# Patient Record
Sex: MALE | Race: WHITE | NOT HISPANIC OR LATINO | Employment: OTHER | URBAN - METROPOLITAN AREA
[De-identification: names, ages, dates, MRNs, and addresses within clinical notes are randomized per-mention and may not be internally consistent; named-entity substitution may affect disease eponyms.]

---

## 2014-05-01 LAB
CRC RECOMMENDATION EXT: NORMAL
CRC RECOMMENDATION EXT: NORMAL

## 2019-06-03 ENCOUNTER — HISTORICAL (OUTPATIENT)
Dept: LAB | Facility: HOSPITAL | Age: 69
End: 2019-06-03

## 2019-06-03 LAB
ALBUMIN SERPL-MCNC: 3.5 GM/DL (ref 3.4–5)
ALP SERPL-CCNC: 116 UNIT/L (ref 46–116)
ALT SERPL-CCNC: 23 UNIT/L (ref 12–78)
AST SERPL-CCNC: 14 UNIT/L (ref 15–37)
BILIRUB SERPL-MCNC: 0.8 MG/DL (ref 0.2–1)
BILIRUBIN DIRECT+TOT PNL SERPL-MCNC: 0.24 MG/DL (ref 0–0.2)
BILIRUBIN DIRECT+TOT PNL SERPL-MCNC: 0.56 MG/DL (ref 0–0.8)
CHOLEST SERPL-MCNC: 150 MG/DL (ref 0–200)
CHOLEST/HDLC SERPL: 3.5 {RATIO} (ref 0–5)
HDLC SERPL-MCNC: 43 MG/DL (ref 40–60)
LDLC SERPL CALC-MCNC: 71 MG/DL (ref 0–129)
PROT SERPL-MCNC: 7.5 GM/DL (ref 6.4–8.2)
TRIGL SERPL-MCNC: 182 MG/DL
VLDLC SERPL CALC-MCNC: 36 MG/DL

## 2020-05-05 ENCOUNTER — HISTORICAL (OUTPATIENT)
Dept: LAB | Facility: HOSPITAL | Age: 70
End: 2020-05-05

## 2020-05-05 LAB
ALBUMIN SERPL-MCNC: 3.7 GM/DL (ref 3.4–5)
ALP SERPL-CCNC: 88 UNIT/L (ref 46–116)
ALT SERPL-CCNC: 31 UNIT/L (ref 12–78)
AST SERPL-CCNC: 32 UNIT/L (ref 15–37)
BILIRUB SERPL-MCNC: 0.8 MG/DL (ref 0.2–1)
BILIRUBIN DIRECT+TOT PNL SERPL-MCNC: 0.28 MG/DL (ref 0–0.2)
BILIRUBIN DIRECT+TOT PNL SERPL-MCNC: 0.52 MG/DL (ref 0–0.8)
CHOLEST SERPL-MCNC: 133 MG/DL (ref 0–200)
CHOLEST/HDLC SERPL: 3.3 {RATIO} (ref 0–5)
HDLC SERPL-MCNC: 40 MG/DL (ref 40–60)
LDLC SERPL CALC-MCNC: 66 MG/DL (ref 0–129)
PROT SERPL-MCNC: 7.6 GM/DL (ref 6.4–8.2)
TRIGL SERPL-MCNC: 133 MG/DL
VLDLC SERPL CALC-MCNC: 27 MG/DL

## 2021-05-17 LAB
LEFT EYE DM RETINOPATHY: POSITIVE
RIGHT EYE DM RETINOPATHY: POSITIVE

## 2022-04-11 ENCOUNTER — HISTORICAL (OUTPATIENT)
Dept: ADMINISTRATIVE | Facility: HOSPITAL | Age: 72
End: 2022-04-11

## 2022-04-25 VITALS
DIASTOLIC BLOOD PRESSURE: 62 MMHG | OXYGEN SATURATION: 97 % | SYSTOLIC BLOOD PRESSURE: 124 MMHG | HEIGHT: 70 IN | BODY MASS INDEX: 38.7 KG/M2 | WEIGHT: 270.31 LBS

## 2022-05-25 DIAGNOSIS — E78.5 HYPERLIPIDEMIA, UNSPECIFIED HYPERLIPIDEMIA TYPE: ICD-10-CM

## 2022-05-25 DIAGNOSIS — I25.10 CORONARY ARTERY DISEASE, UNSPECIFIED VESSEL OR LESION TYPE, UNSPECIFIED WHETHER ANGINA PRESENT, UNSPECIFIED WHETHER NATIVE OR TRANSPLANTED HEART: Primary | ICD-10-CM

## 2022-05-25 DIAGNOSIS — Z79.4 TYPE 2 DIABETES MELLITUS WITH DIABETIC POLYNEUROPATHY, WITH LONG-TERM CURRENT USE OF INSULIN: ICD-10-CM

## 2022-05-25 DIAGNOSIS — G47.33 OSA (OBSTRUCTIVE SLEEP APNEA): ICD-10-CM

## 2022-05-25 DIAGNOSIS — E66.01 MORBID OBESITY: ICD-10-CM

## 2022-05-25 DIAGNOSIS — N17.9 ACUTE KIDNEY INJURY: ICD-10-CM

## 2022-05-25 DIAGNOSIS — E11.42 TYPE 2 DIABETES MELLITUS WITH DIABETIC POLYNEUROPATHY, WITH LONG-TERM CURRENT USE OF INSULIN: ICD-10-CM

## 2022-05-25 DIAGNOSIS — I10 HYPERTENSION, UNSPECIFIED TYPE: ICD-10-CM

## 2022-05-26 ENCOUNTER — TELEPHONE (OUTPATIENT)
Dept: FAMILY MEDICINE | Facility: CLINIC | Age: 72
End: 2022-05-26
Payer: MEDICARE

## 2022-05-26 NOTE — TELEPHONE ENCOUNTER
1. Are there any outstanding tasks in patient's chart?    n  2. Do we have outstanding/pending referrals?    n  3. Has the patient been seen in an ER, Urgent Care, or admitted since last visit?  n    4. Has patient seen any other health care providers since last visit?  n    5.  Has patient had any blood work or x-rays done since last visit?  Will complete [facility] at next [facility] appointment

## 2022-05-31 LAB
% NEUTROPHILS (OHS): 50.9 % (ref 44–80)
25(OH)D3 SERPL-MCNC: 38.66 NG/ML
ALBUMIN SERPL BCP-MCNC: 4.2 G/DL (ref 3.2–4.7)
ALP SERPL-CCNC: 72 U/L (ref 38–126)
ALT SERPL W P-5'-P-CCNC: 37 U/L (ref 17–63)
AST SERPL-CCNC: 29 U/L (ref 8–40)
BASOPHILS NFR BLD: 0.1 K/UL (ref 0–0.2)
BASOPHILS NFR BLD: 0.8 % (ref 0–2)
BUN BLD-MCNC: 20 MG/DL (ref 7–20)
CALCIUM SERPL-MCNC: 9.8 MG/DL (ref 8.1–10.3)
CHLORIDE: 100 MMOL/L (ref 98–107)
CHOLEST SERPL-MSCNC: 190 MG/DL (ref 0–200)
CO2 SERPL-SCNC: 30 MMOL/L (ref 23–32)
CREAT SERPL-MCNC: 1.4 MG/DL (ref 0.7–1.2)
EOSINOPHIL NFR BLD: 0.2 K/UL (ref 0–0.5)
EOSINOPHIL NFR BLD: 2.9 % (ref 0–5)
GLUCOSE: 101 MG/DL (ref 70–110)
HBA1C MFR BLD: 11.4 % (ref 4.2–5.8)
HCT VFR BLD AUTO: 44.2 % (ref 42–52)
HDLC SERPL-MCNC: 44 MG/DL (ref 29–71)
HGB BLD-MCNC: 15.5 G/DL (ref 14–18)
LDLC SERPL CALC-MCNC: 125 MG/DL (ref 0–100)
LYMPH #: 2.8 K/UL (ref 0.8–3.1)
LYMPH%: 34.7 % (ref 13–45)
MCH RBC QN AUTO: 33.5 UUG (ref 26–34)
MCHC RBC AUTO-ENTMCNC: 35 GM/DL (ref 31–37)
MCV RBC AUTO: 95.6 FL (ref 81–99)
MONO #: 0.9 K/UL (ref 0.12–0.8)
MONO%: 10.7 % (ref 2–11)
NEUTROPHILS - ABS (DIFF): 4 /ΜL (ref 2–8)
PLATELET # BLD AUTO: 251 K/CMM (ref 150–450)
POTASSIUM: 4.5 MMOL/L (ref 3.4–5)
PSA SERPL-MCNC: 0.71 NG/ML (ref 0–4)
RBC # BLD AUTO: 4.63 M/CMM (ref 4.7–6.1)
RDW-CV: 13.6 % (ref 11.5–14.5)
SODIUM: 138 MMOL/L (ref 135–145)
TOTAL PROTEIN: 8 G/DL (ref 6.4–8.3)
TRIGL SERPL-MCNC: 147 MG/DL (ref 0–200)
WBC: 7.9 K/CMM (ref 4.5–10.8)

## 2022-09-08 LAB
ALBUMIN SERPL BCP-MCNC: 4.1 G/DL (ref 3.2–4.7)
ALP SERPL-CCNC: 67 U/L (ref 38–126)
ALT SERPL W P-5'-P-CCNC: 28 U/L (ref 17–63)
AST SERPL-CCNC: 27 U/L (ref 8–40)
BILIRUB SERPL-MCNC: 0.8 MG/DL (ref 0–1)
BUN BLD-MCNC: 20 MG/DL (ref 7–20)
CALCIUM SERPL-MCNC: 9.6 MG/DL (ref 8.1–10.3)
CHLORIDE: 102 MMOL/L (ref 98–107)
CREAT SERPL-MCNC: 1.5 MG/DL (ref 0.7–1.2)
CREAT UR-MCNC: 145.9 MG/DL
GLUCOSE: 130 MG/DL (ref 70–110)
HBA1C MFR BLD: 9.3 % (ref 4.2–5.8)
Lab: 34.6 (ref 0–16)
MICROALBUMIN UR-MCNC: 54.8 MG/L (ref ?–19)
POTASSIUM: 4.7 MMOL/L (ref 3.4–5)
SODIUM: 139 MMOL/L (ref 135–145)
TOTAL PROTEIN: 7.5 G/DL (ref 6.4–8.3)

## 2022-11-21 ENCOUNTER — TELEPHONE (OUTPATIENT)
Dept: FAMILY MEDICINE | Facility: CLINIC | Age: 72
End: 2022-11-21
Payer: MEDICARE

## 2022-11-21 NOTE — TELEPHONE ENCOUNTER
No answer / no voicemail when attempted to contact patient for previsit call.  [facility] completes labs at [facility] visits. No labs needed for this visit

## 2022-12-01 ENCOUNTER — OFFICE VISIT (OUTPATIENT)
Dept: FAMILY MEDICINE | Facility: CLINIC | Age: 72
End: 2022-12-01
Payer: MEDICARE

## 2022-12-01 VITALS
HEART RATE: 82 BPM | SYSTOLIC BLOOD PRESSURE: 120 MMHG | BODY MASS INDEX: 39.37 KG/M2 | RESPIRATION RATE: 16 BRPM | DIASTOLIC BLOOD PRESSURE: 80 MMHG | HEIGHT: 70 IN | WEIGHT: 275 LBS | OXYGEN SATURATION: 98 % | TEMPERATURE: 98 F

## 2022-12-01 DIAGNOSIS — Z71.89 ADVANCED CARE PLANNING/COUNSELING DISCUSSION: ICD-10-CM

## 2022-12-01 DIAGNOSIS — Z12.5 PROSTATE CANCER SCREENING: ICD-10-CM

## 2022-12-01 DIAGNOSIS — Z23 NEED FOR PNEUMOCOCCAL VACCINATION: ICD-10-CM

## 2022-12-01 DIAGNOSIS — Z79.4 TYPE 2 DIABETES MELLITUS WITH DIABETIC POLYNEUROPATHY, WITH LONG-TERM CURRENT USE OF INSULIN: ICD-10-CM

## 2022-12-01 DIAGNOSIS — Z00.00 MEDICARE ANNUAL WELLNESS VISIT, SUBSEQUENT: Primary | ICD-10-CM

## 2022-12-01 DIAGNOSIS — I10 HYPERTENSION, UNSPECIFIED TYPE: ICD-10-CM

## 2022-12-01 DIAGNOSIS — E11.42 TYPE 2 DIABETES MELLITUS WITH DIABETIC POLYNEUROPATHY, WITH LONG-TERM CURRENT USE OF INSULIN: ICD-10-CM

## 2022-12-01 DIAGNOSIS — E78.5 HYPERLIPIDEMIA, UNSPECIFIED HYPERLIPIDEMIA TYPE: ICD-10-CM

## 2022-12-01 DIAGNOSIS — I25.10 CORONARY ARTERY DISEASE, UNSPECIFIED VESSEL OR LESION TYPE, UNSPECIFIED WHETHER ANGINA PRESENT, UNSPECIFIED WHETHER NATIVE OR TRANSPLANTED HEART: ICD-10-CM

## 2022-12-01 PROCEDURE — 90677 PCV20 VACCINE IM: CPT | Mod: ,,, | Performed by: NURSE PRACTITIONER

## 2022-12-01 PROCEDURE — G0439 PPPS, SUBSEQ VISIT: HCPCS | Mod: ,,, | Performed by: NURSE PRACTITIONER

## 2022-12-01 PROCEDURE — G0009 PNEUMOCOCCAL CONJUGATE VACCINE 20-VALENT: ICD-10-PCS | Mod: ,,, | Performed by: NURSE PRACTITIONER

## 2022-12-01 PROCEDURE — G0009 ADMIN PNEUMOCOCCAL VACCINE: HCPCS | Mod: ,,, | Performed by: NURSE PRACTITIONER

## 2022-12-01 PROCEDURE — 90677 PNEUMOCOCCAL CONJUGATE VACCINE 20-VALENT: ICD-10-PCS | Mod: ,,, | Performed by: NURSE PRACTITIONER

## 2022-12-01 PROCEDURE — G0439 PR MEDICARE ANNUAL WELLNESS SUBSEQUENT VISIT: ICD-10-PCS | Mod: ,,, | Performed by: NURSE PRACTITIONER

## 2022-12-01 RX ORDER — METFORMIN HYDROCHLORIDE 1000 MG/1
1000 TABLET ORAL 2 TIMES DAILY WITH MEALS
COMMUNITY
End: 2023-11-30

## 2022-12-01 RX ORDER — LISINOPRIL 10 MG/1
10 TABLET ORAL
COMMUNITY
Start: 2022-07-12 | End: 2023-03-29

## 2022-12-01 RX ORDER — ATORVASTATIN CALCIUM 80 MG/1
80 TABLET, FILM COATED ORAL
COMMUNITY

## 2022-12-01 RX ORDER — ASPIRIN 81 MG/1
81 TABLET ORAL DAILY
COMMUNITY
End: 2023-09-21 | Stop reason: ALTCHOICE

## 2022-12-01 RX ORDER — ALOGLIPTIN 25 MG/1
25 TABLET, FILM COATED ORAL DAILY
COMMUNITY

## 2022-12-01 RX ORDER — CARVEDILOL 25 MG/1
12.5 TABLET ORAL 2 TIMES DAILY
COMMUNITY
End: 2023-09-21 | Stop reason: DRUGHIGH

## 2022-12-02 ENCOUNTER — DOCUMENTATION ONLY (OUTPATIENT)
Dept: INTERNAL MEDICINE | Facility: CLINIC | Age: 72
End: 2022-12-02
Payer: MEDICARE

## 2022-12-02 ENCOUNTER — DOCUMENTATION ONLY (OUTPATIENT)
Dept: FAMILY MEDICINE | Facility: CLINIC | Age: 72
End: 2022-12-02
Payer: MEDICARE

## 2022-12-02 NOTE — ASSESSMENT & PLAN NOTE
Healthcare power of  and living will completed at visit and scanned into epic.  Originals given to patient.

## 2022-12-02 NOTE — ASSESSMENT & PLAN NOTE
Has not seen Endocrinology now for the last 6-8 months.  Encouraged patient follow-up with Endocrinology.

## 2022-12-02 NOTE — ASSESSMENT & PLAN NOTE
Stable on current meds, [facility] Prince, follow-up here in 1 year.

## 2023-03-06 PROBLEM — Z00.00 MEDICARE ANNUAL WELLNESS VISIT, SUBSEQUENT: Status: RESOLVED | Noted: 2022-12-01 | Resolved: 2023-03-06

## 2023-03-22 ENCOUNTER — TELEPHONE (OUTPATIENT)
Dept: FAMILY MEDICINE | Facility: CLINIC | Age: 73
End: 2023-03-22
Payer: MEDICARE

## 2023-03-22 NOTE — TELEPHONE ENCOUNTER
Call from [facility] with information about patients [employer].  He does not have a modem on the machine so the only way the information will be able to be sent over is if the patient brings the SD card to [facility] and they upload the information.  Patient will bring in his SD card the next time he goes to [facility].

## 2023-03-22 NOTE — LETTER
AUTHORIZATION FOR RELEASE OF   CONFIDENTIAL INFORMATION    Dear Endy,    We are seeing Modesto Franco, date of birth 1950, in the clinic at No Department Specified. [facility]. Modesto Franco has an outstanding lab/procedure at the time we reviewed his chart. In order to help keep his health information updated, he has authorized us to request the following medical record(s):        (  )  MAMMOGRAM                                      (  )  COLONOSCOPY      (  )  PAP SMEAR                                          (  )  OUTSIDE LAB RESULTS     (  )  DEXA SCAN                                          (  )  EYE EXAM            (  )  FOOT EXAM                                          (  )  ENTIRE RECORD     (  )  OUTSIDE IMMUNIZATIONS                 ( x ) SLEEP STUDY REPORT       Please fax records to [facility], [phone]      If you have any questions, please contact [phone]          Patient Name: Modesto Franco  : 1950  Patient Phone #: [phone]

## 2023-05-18 LAB
LEFT EYE DM RETINOPATHY: POSITIVE
RIGHT EYE DM RETINOPATHY: POSITIVE

## 2023-07-28 ENCOUNTER — LAB VISIT (OUTPATIENT)
Dept: LAB | Facility: HOSPITAL | Age: 73
End: 2023-07-28
Attending: CLINICAL NURSE SPECIALIST
Payer: MEDICARE

## 2023-07-28 DIAGNOSIS — E78.5 HYPERLIPIDEMIA, UNSPECIFIED HYPERLIPIDEMIA TYPE: Primary | ICD-10-CM

## 2023-07-28 DIAGNOSIS — I10 ESSENTIAL HYPERTENSION, MALIGNANT: ICD-10-CM

## 2023-07-28 LAB
ALBUMIN SERPL-MCNC: 3.7 G/DL (ref 3.4–4.8)
ALP SERPL-CCNC: 77 UNIT/L (ref 40–150)
ALT SERPL-CCNC: 21 UNIT/L (ref 0–55)
AST SERPL-CCNC: 24 UNIT/L (ref 5–34)
BILIRUBIN DIRECT+TOT PNL SERPL-MCNC: 0.4 MG/DL (ref 0–?)
BILIRUBIN DIRECT+TOT PNL SERPL-MCNC: 0.9 MG/DL (ref 0–0.8)
BILIRUBIN DIRECT+TOT PNL SERPL-MCNC: 1.3 MG/DL
CHOLEST SERPL-MCNC: 160 MG/DL
CHOLEST/HDLC SERPL: 5 {RATIO} (ref 0–5)
HDLC SERPL-MCNC: 31 MG/DL (ref 35–60)
LDLC SERPL CALC-MCNC: 105 MG/DL (ref 50–140)
PROT SERPL-MCNC: 7 GM/DL (ref 5.8–7.6)
TRIGL SERPL-MCNC: 122 MG/DL (ref 34–140)
VLDLC SERPL CALC-MCNC: 24 MG/DL

## 2023-07-28 PROCEDURE — 80076 HEPATIC FUNCTION PANEL: CPT

## 2023-07-28 PROCEDURE — 36415 COLL VENOUS BLD VENIPUNCTURE: CPT

## 2023-07-28 PROCEDURE — 80061 LIPID PANEL: CPT

## 2023-09-05 LAB
A1C: 10.3 (ref 4.2–5.8)
ALBUMIN: 4 (ref 3.2–4.7)
ALKALINE PHOS(POC): 66 (ref 38–126)
ALT: 21 (ref 17–63)
AST: 32 (ref 8–40)
BASO, FLUID: 1 % (ref 0–2)
BASOPHILS NFR BLD: 0.1 K/UL (ref 0–0.2)
CALCIUM SERPL-MCNC: 9.1 MG/DL (ref 8.1–10.3)
CHLORIDE: 99 MMOL/L (ref 98–107)
CHOLEST SERPL-MSCNC: 185 MG/DL (ref 0–200)
CO2 SERPL-SCNC: 35 MMOL/L (ref 23–32)
CREAT SERPL-MCNC: 1.6 MG/DL (ref 0.7–1.2)
EOS, FLUID: 1.2 % (ref 0–5)
EOSINOPHIL NFR BLD: 0.1 K/UL (ref 0–0.5)
ERYTHROCYTE [DISTWIDTH] IN BLOOD BY AUTOMATED COUNT: 13.6 % (ref 11.5–14.5)
GLUCOSE: 78 (ref 70–110)
HCT: 45.9 (ref 42–52)
HDLC SERPL-MCNC: 38 MG/DL (ref 29–71)
HGB: 15.5 (ref 14–18)
LDLC SERPL CALC-MCNC: 119 MG/DL (ref 0–100)
LYMPH #: 2.9 K/UL (ref 0.8–3.1)
LYMPH%: 30.8 % (ref 13–45)
MCH RBC QN AUTO: 32.1 PG (ref 26–34)
MCHC RBC AUTO-ENTMCNC: 33.8 G/DL (ref 31–37)
MCV RBC AUTO: 95.1 FL (ref 81–99)
MONO #: 0.9 K/UL (ref 0.12–0.8)
MONO%: 10.1 % (ref 2–11)
NEUTROPHILS NFR BLD: 5.3 % (ref 2–7.5)
PLATELET COUNT: 286 (ref 150–450)
POTASSIUM: 4.1 MMOL/L (ref 3.4–5)
RBC # BLD AUTO: 4.82 10*6/UL (ref 4.7–6.1)
SODIUM BLD-SCNC: 135 MMOL/L (ref 135–145)
TOTAL PROTEIN: 8 G/DL (ref 6.4–8.3)
TRIGL SERPL-MCNC: 117 MG/DL (ref 40–160)
TRIMETHOPRIM ISLT KB: 56.9 % (ref 44–80)
UREA NITROGEN (BUN): 22 MG/DL (ref 7–20)
WBC: 9.3 (ref 4.5–10.8)

## 2023-09-08 LAB
CALCIUM SERPL-MCNC: 9 MG/DL (ref 8.6–10.5)
CHLORIDE: 98 MMOL/L (ref 98–109)
CO2 SERPL-SCNC: 27 MMOL/L (ref 22–34)
CREAT SERPL-MCNC: 1.8 MG/DL (ref 0.6–1.3)
EGFR: 37.2
ERYTHROCYTE [DISTWIDTH] IN BLOOD BY AUTOMATED COUNT: 13.6 % (ref 12.1–16.2)
GLUCOSE: 245 (ref 70–106)
HCT: 46.7 (ref 36.7–47.1)
HGB: 15.2 (ref 12.5–16.3)
MCH RBC QN AUTO: 31.9 PG (ref 23.8–33.4)
MCHC RBC AUTO-ENTMCNC: 32.5 G/DL (ref 32.5–36.3)
MCV RBC AUTO: 98.1 FL (ref 73–96.2)
MPC BLD CALC-MCNC: 8.3 FL (ref 7.4–11.4)
PLATELET COUNT: 269 (ref 152–348)
POTASSIUM: 5.2 MMOL/L (ref 3.4–5)
RBC # BLD AUTO: 4.76 10*6/UL (ref 4.06–5.63)
SODIUM BLD-SCNC: 136 MMOL/L (ref 135–145)
UREA NITROGEN (BUN): 24 MG/DL (ref 7–25)
WBC: 7.2 (ref 3.6–10.2)

## 2023-09-11 ENCOUNTER — LAB VISIT (OUTPATIENT)
Dept: LAB | Facility: HOSPITAL | Age: 73
End: 2023-09-11
Attending: NURSE PRACTITIONER
Payer: MEDICARE

## 2023-09-11 ENCOUNTER — TELEPHONE (OUTPATIENT)
Dept: FAMILY MEDICINE | Facility: CLINIC | Age: 73
End: 2023-09-11
Payer: MEDICARE

## 2023-09-11 DIAGNOSIS — N18.9 CHRONIC KIDNEY DISEASE, UNSPECIFIED: ICD-10-CM

## 2023-09-11 DIAGNOSIS — I25.10 CORONARY ATHEROSCLEROSIS OF NATIVE CORONARY ARTERY: ICD-10-CM

## 2023-09-11 DIAGNOSIS — R94.39 ABNORMAL BALLISTOCARDIOGRAM: Primary | ICD-10-CM

## 2023-09-11 LAB
ANION GAP SERPL CALC-SCNC: 13 MEQ/L
BUN SERPL-MCNC: 20.3 MG/DL (ref 8.4–25.7)
CALCIUM SERPL-MCNC: 9.3 MG/DL (ref 8.8–10)
CHLORIDE SERPL-SCNC: 95 MMOL/L (ref 98–107)
CO2 SERPL-SCNC: 23 MMOL/L (ref 23–31)
CREAT SERPL-MCNC: 1.99 MG/DL (ref 0.73–1.18)
CREAT/UREA NIT SERPL: 10
GFR SERPLBLD CREATININE-BSD FMLA CKD-EPI: 35 MLS/MIN/1.73/M2
GLUCOSE SERPL-MCNC: 408 MG/DL (ref 82–115)
POTASSIUM SERPL-SCNC: 4.6 MMOL/L (ref 3.5–5.1)
SODIUM SERPL-SCNC: 131 MMOL/L (ref 136–145)

## 2023-09-11 PROCEDURE — 36415 COLL VENOUS BLD VENIPUNCTURE: CPT

## 2023-09-11 PROCEDURE — 80048 BASIC METABOLIC PNL TOTAL CA: CPT

## 2023-09-11 NOTE — TELEPHONE ENCOUNTER
Left a message for Hailey at [facility] to forward chart notes and lab results to Dr. Kennedy to address patients sugar levels.

## 2023-09-11 NOTE — TELEPHONE ENCOUNTER
Patient is supposed to be seeing endocrinology, Dr. Kennedy, for his diabetes management.  He only comes here once yearly because [facility].  He needs to follow-up with his endocrinologist.  I do not prescribe his diabetes medications.

## 2023-09-11 NOTE — TELEPHONE ENCOUNTER
Call from Josselyn at [facility] stating that they ordered labs at [facility] for the patient to complete for his Angiogram.  They wanted to inform you that his sugar level was 408 so that you can address.  He had a few other test that were elevated so they had him return for additional testing.  Please review lab results in patient chart.

## 2023-09-12 ENCOUNTER — HOSPITAL ENCOUNTER (OUTPATIENT)
Facility: HOSPITAL | Age: 73
Discharge: HOME | End: 2023-09-12
Attending: INTERNAL MEDICINE | Admitting: INTERNAL MEDICINE
Payer: MEDICARE

## 2023-09-12 DIAGNOSIS — I25.10 CAD (CORONARY ARTERY DISEASE): ICD-10-CM

## 2023-09-12 DIAGNOSIS — Z01.810 PREOP CARDIOVASCULAR EXAM: ICD-10-CM

## 2023-09-12 DIAGNOSIS — I65.23 BILATERAL CAROTID ARTERY STENOSIS: ICD-10-CM

## 2023-09-12 DIAGNOSIS — R94.39 ABNORMAL STRESS TEST: ICD-10-CM

## 2023-09-12 PROCEDURE — 99900031 HC PATIENT EDUCATION (STAT)

## 2023-09-12 PROCEDURE — 93005 ELECTROCARDIOGRAM TRACING: CPT

## 2023-09-12 PROCEDURE — C1769 GUIDE WIRE: HCPCS | Performed by: INTERNAL MEDICINE

## 2023-09-12 PROCEDURE — C1894 INTRO/SHEATH, NON-LASER: HCPCS | Performed by: INTERNAL MEDICINE

## 2023-09-12 PROCEDURE — C1725 CATH, TRANSLUMIN NON-LASER: HCPCS | Performed by: INTERNAL MEDICINE

## 2023-09-12 PROCEDURE — 99152 MOD SED SAME PHYS/QHP 5/>YRS: CPT | Performed by: INTERNAL MEDICINE

## 2023-09-12 PROCEDURE — 25500020 PHARM REV CODE 255: Performed by: INTERNAL MEDICINE

## 2023-09-12 PROCEDURE — C1874 STENT, COATED/COV W/DEL SYS: HCPCS | Performed by: INTERNAL MEDICINE

## 2023-09-12 PROCEDURE — 0715T *HC CORONARY LITHOTRIPSY: CPT | Performed by: INTERNAL MEDICINE

## 2023-09-12 PROCEDURE — 93459 L HRT ART/GRFT ANGIO: CPT | Mod: 59 | Performed by: INTERNAL MEDICINE

## 2023-09-12 PROCEDURE — 27201423 OPTIME MED/SURG SUP & DEVICES STERILE SUPPLY: Performed by: INTERNAL MEDICINE

## 2023-09-12 PROCEDURE — 93010 EKG 12-LEAD: ICD-10-PCS | Mod: ,,, | Performed by: INTERNAL MEDICINE

## 2023-09-12 PROCEDURE — 63600175 PHARM REV CODE 636 W HCPCS: Performed by: INTERNAL MEDICINE

## 2023-09-12 PROCEDURE — 25000003 PHARM REV CODE 250: Performed by: INTERNAL MEDICINE

## 2023-09-12 PROCEDURE — 93010 ELECTROCARDIOGRAM REPORT: CPT | Mod: ,,, | Performed by: INTERNAL MEDICINE

## 2023-09-12 PROCEDURE — C9600 PERC DRUG-EL COR STENT SING: HCPCS | Mod: LC | Performed by: INTERNAL MEDICINE

## 2023-09-12 PROCEDURE — C1887 CATHETER, GUIDING: HCPCS | Performed by: INTERNAL MEDICINE

## 2023-09-12 PROCEDURE — C1761 OPTIME CATH, TRANSLUMINAL INTRAVASC LITHO, CORONARY: HCPCS | Performed by: INTERNAL MEDICINE

## 2023-09-12 PROCEDURE — 99153 MOD SED SAME PHYS/QHP EA: CPT | Performed by: INTERNAL MEDICINE

## 2023-09-12 DEVICE — EVEROLIMUS-ELUTING PLATINUM CHROMIUM CORONARY STENT SYSTEM
Type: IMPLANTABLE DEVICE | Site: HEART | Status: FUNCTIONAL
Brand: SYNERGY™ XD

## 2023-09-12 RX ORDER — SODIUM CHLORIDE 0.9 % (FLUSH) 0.9 %
10 SYRINGE (ML) INJECTION
Status: DISCONTINUED | OUTPATIENT
Start: 2023-09-12 | End: 2023-09-21

## 2023-09-12 RX ORDER — ACETAMINOPHEN 325 MG/1
650 TABLET ORAL EVERY 4 HOURS PRN
Status: DISCONTINUED | OUTPATIENT
Start: 2023-09-12 | End: 2023-09-12 | Stop reason: HOSPADM

## 2023-09-12 RX ORDER — HYDROCODONE BITARTRATE AND ACETAMINOPHEN 5; 325 MG/1; MG/1
1 TABLET ORAL EVERY 4 HOURS PRN
Status: DISCONTINUED | OUTPATIENT
Start: 2023-09-12 | End: 2023-09-12 | Stop reason: HOSPADM

## 2023-09-12 RX ORDER — IBUPROFEN 800 MG/1
800 TABLET ORAL EVERY 6 HOURS PRN
COMMUNITY
End: 2023-09-21

## 2023-09-12 RX ORDER — SODIUM CHLORIDE 9 MG/ML
INJECTION, SOLUTION INTRAVENOUS ONCE
Status: DISCONTINUED | OUTPATIENT
Start: 2023-09-12 | End: 2023-09-12

## 2023-09-12 RX ORDER — ROSUVASTATIN CALCIUM 40 MG/1
40 TABLET, COATED ORAL NIGHTLY
Status: ON HOLD | COMMUNITY
End: 2023-09-12 | Stop reason: CLARIF

## 2023-09-12 RX ORDER — MORPHINE SULFATE 4 MG/ML
2 INJECTION, SOLUTION INTRAMUSCULAR; INTRAVENOUS EVERY 4 HOURS PRN
Status: DISCONTINUED | OUTPATIENT
Start: 2023-09-12 | End: 2023-09-12 | Stop reason: HOSPADM

## 2023-09-12 RX ORDER — DIPHENHYDRAMINE HCL 50 MG
50 CAPSULE ORAL
Status: DISCONTINUED | OUTPATIENT
Start: 2023-09-12 | End: 2023-09-21

## 2023-09-12 RX ORDER — ONDANSETRON 4 MG/1
8 TABLET, ORALLY DISINTEGRATING ORAL EVERY 8 HOURS PRN
Status: DISCONTINUED | OUTPATIENT
Start: 2023-09-12 | End: 2023-09-12 | Stop reason: HOSPADM

## 2023-09-12 RX ORDER — SODIUM CHLORIDE 9 MG/ML
INJECTION, SOLUTION INTRAVENOUS CONTINUOUS
Status: ACTIVE | OUTPATIENT
Start: 2023-09-12 | End: 2023-09-12

## 2023-09-12 RX ORDER — HEPARIN SODIUM 1000 [USP'U]/ML
INJECTION, SOLUTION INTRAVENOUS; SUBCUTANEOUS
Status: DISCONTINUED | OUTPATIENT
Start: 2023-09-12 | End: 2023-09-12 | Stop reason: HOSPADM

## 2023-09-12 RX ORDER — DIAZEPAM 5 MG/1
10 TABLET ORAL
Status: DISCONTINUED | OUTPATIENT
Start: 2023-09-12 | End: 2023-09-21

## 2023-09-12 RX ORDER — MIDAZOLAM HYDROCHLORIDE 1 MG/ML
INJECTION, SOLUTION INTRAMUSCULAR; INTRAVENOUS
Status: DISCONTINUED | OUTPATIENT
Start: 2023-09-12 | End: 2023-09-12 | Stop reason: HOSPADM

## 2023-09-12 RX ORDER — LIDOCAINE HYDROCHLORIDE 10 MG/ML
INJECTION INFILTRATION; PERINEURAL
Status: DISCONTINUED | OUTPATIENT
Start: 2023-09-12 | End: 2023-09-12 | Stop reason: HOSPADM

## 2023-09-12 RX ORDER — SODIUM CHLORIDE 9 MG/ML
0-999 INJECTION, SOLUTION INTRAVENOUS CONTINUOUS
Status: DISPENSED | OUTPATIENT
Start: 2023-09-12 | End: 2023-09-12

## 2023-09-12 RX ORDER — FENTANYL CITRATE 50 UG/ML
INJECTION, SOLUTION INTRAMUSCULAR; INTRAVENOUS
Status: DISCONTINUED | OUTPATIENT
Start: 2023-09-12 | End: 2023-09-12 | Stop reason: HOSPADM

## 2023-09-12 RX ORDER — INSULIN DETEMIR 100 [IU]/ML
75 INJECTION, SOLUTION SUBCUTANEOUS 2 TIMES DAILY
COMMUNITY

## 2023-09-12 RX ORDER — MAG HYDROX/ALUMINUM HYD/SIMETH 200-200-20
SUSPENSION, ORAL (FINAL DOSE FORM) ORAL
Status: DISCONTINUED | OUTPATIENT
Start: 2023-09-12 | End: 2023-09-12 | Stop reason: HOSPADM

## 2023-09-12 RX ADMIN — DIPHENHYDRAMINE HYDROCHLORIDE 50 MG: 50 CAPSULE ORAL at 07:09

## 2023-09-12 RX ADMIN — SODIUM CHLORIDE: 9 INJECTION, SOLUTION INTRAVENOUS at 09:09

## 2023-09-12 RX ADMIN — SODIUM CHLORIDE 125 ML/HR: 9 INJECTION, SOLUTION INTRAVENOUS at 07:09

## 2023-09-12 RX ADMIN — DIAZEPAM 10 MG: 5 TABLET ORAL at 07:09

## 2023-09-12 NOTE — Clinical Note
The catheter was inserted into the, insertion attempt was made into the and was inserted over the wire into the left main  circumflex. CATH REMOVED

## 2023-09-12 NOTE — Clinical Note
The catheter was repositioned into the and was inserted over the wire into the ostium   right coronary artery. An angiography was performed of the right coronary arteries. Multiple views were taken. The angiography was performed via power injection.  JR Cardoza

## 2023-09-12 NOTE — Clinical Note
A dressing was applied to the right femoral artery puncture site. Sheath left in place, manual pressure will be held in outpatient

## 2023-09-12 NOTE — Clinical Note
The catheter was inserted into the and was inserted over the wire into the ostium   left main. Hemodynamics were performed.  An angiography was performed of the left coronary arteries. Multiple views were taken. The angiography was performed via power injection.  JL 4

## 2023-09-12 NOTE — Clinical Note
The catheter insertion attempt was made into the and was inserted over the wire into the. An angiography was performed of the ATTEMPTED TO INSERT IN GRAFT. Multiple views were taken. The angiography was performed via power injection.  CATH REMOVED

## 2023-09-12 NOTE — Clinical Note
The catheter was inserted into the and was inserted over the wire into the ostial LIMA graft. Hemodynamics were performed.  An angiography was performed of the graft. Multiple views were taken. The angiography was performed via power injection.  MALONE TO LAD  CATH. REMOVED

## 2023-09-12 NOTE — Clinical Note
The catheter was repositioned into the and was inserted over the wire into the ostial SVG graft. An angiography was performed of the graft. Multiple views were taken. The angiography was performed via power injection.  JR 4  SVG TO DIAG  SVG TO LCX    CATH. REMOVED

## 2023-09-12 NOTE — DISCHARGE INSTRUCTIONS
-Remove dressing in 24hrs  -No driving for two Days  -Do not lift anything heavier than a gallon of milk for 5 days  -No tub bathing for 5 days (if you have a groin site).   -No lotions, powders, creams around site for 5 days  - Return to the nearest emergency room if you start running a fever; have any kind of discharge coming from the site, the site looks red or swollen.  - If site starts to bleed, lay flat on the ground, apply pressure to the site and call 911.    Resume Metformin in 2 days.   prescription for Brilinta and Eliquis at the [facility] in [place].   Start Brilinta tonight, start Eliquis tomorrow.   Stop taking Aspirin!!

## 2023-09-12 NOTE — Clinical Note
The catheter was inserted into the and was inserted over the wire into the ostial SVG graft. Hemodynamics were performed.  An angiography was performed of the graft. Multiple views were taken. The angiography was performed via power injection.  SVG TO RCA   CATH REMOVED

## 2023-09-13 LAB — POCT GLUCOSE: 215 MG/DL (ref 70–110)

## 2023-09-14 VITALS
BODY MASS INDEX: 36.49 KG/M2 | RESPIRATION RATE: 23 BRPM | DIASTOLIC BLOOD PRESSURE: 78 MMHG | SYSTOLIC BLOOD PRESSURE: 105 MMHG | TEMPERATURE: 98 F | WEIGHT: 254.88 LBS | OXYGEN SATURATION: 95 % | HEART RATE: 75 BPM | HEIGHT: 70 IN

## 2023-09-20 ENCOUNTER — DOCUMENTATION ONLY (OUTPATIENT)
Dept: FAMILY MEDICINE | Facility: CLINIC | Age: 73
End: 2023-09-20
Payer: MEDICARE

## 2023-09-20 DIAGNOSIS — Z79.4 TYPE 2 DIABETES MELLITUS WITH DIABETIC POLYNEUROPATHY, WITH LONG-TERM CURRENT USE OF INSULIN: Primary | ICD-10-CM

## 2023-09-20 DIAGNOSIS — E11.42 TYPE 2 DIABETES MELLITUS WITH DIABETIC POLYNEUROPATHY, WITH LONG-TERM CURRENT USE OF INSULIN: Primary | ICD-10-CM

## 2023-09-20 RX ORDER — GABAPENTIN 300 MG/1
CAPSULE ORAL
Qty: 270 CAPSULE | Refills: 3 | Status: SHIPPED | OUTPATIENT
Start: 2023-09-20 | End: 2023-09-21

## 2023-09-21 ENCOUNTER — OFFICE VISIT (OUTPATIENT)
Dept: FAMILY MEDICINE | Facility: CLINIC | Age: 73
End: 2023-09-21
Payer: MEDICARE

## 2023-09-21 VITALS
OXYGEN SATURATION: 96 % | DIASTOLIC BLOOD PRESSURE: 60 MMHG | RESPIRATION RATE: 16 BRPM | SYSTOLIC BLOOD PRESSURE: 102 MMHG | TEMPERATURE: 97 F | HEART RATE: 56 BPM | HEIGHT: 70 IN | BODY MASS INDEX: 36.57 KG/M2

## 2023-09-21 DIAGNOSIS — I65.23 ATHEROSCLEROSIS OF BOTH CAROTID ARTERIES: ICD-10-CM

## 2023-09-21 DIAGNOSIS — Z79.4 TYPE 2 DIABETES MELLITUS WITH DIABETIC POLYNEUROPATHY, WITH LONG-TERM CURRENT USE OF INSULIN: ICD-10-CM

## 2023-09-21 DIAGNOSIS — I25.10 CORONARY ARTERY DISEASE INVOLVING NATIVE CORONARY ARTERY OF NATIVE HEART, UNSPECIFIED WHETHER ANGINA PRESENT: ICD-10-CM

## 2023-09-21 DIAGNOSIS — E66.01 MORBID OBESITY: ICD-10-CM

## 2023-09-21 DIAGNOSIS — N18.32 STAGE 3B CHRONIC KIDNEY DISEASE: Primary | ICD-10-CM

## 2023-09-21 DIAGNOSIS — I10 HYPERTENSION, UNSPECIFIED TYPE: ICD-10-CM

## 2023-09-21 DIAGNOSIS — E78.5 HYPERLIPIDEMIA, UNSPECIFIED HYPERLIPIDEMIA TYPE: ICD-10-CM

## 2023-09-21 DIAGNOSIS — E11.42 TYPE 2 DIABETES MELLITUS WITH DIABETIC POLYNEUROPATHY, WITH LONG-TERM CURRENT USE OF INSULIN: ICD-10-CM

## 2023-09-21 DIAGNOSIS — R41.3 IMPAIRED MEMORY: ICD-10-CM

## 2023-09-21 PROBLEM — N17.9 ACUTE KIDNEY INJURY: Status: RESOLVED | Noted: 2022-05-25 | Resolved: 2023-09-21

## 2023-09-21 PROCEDURE — 99214 PR OFFICE/OUTPT VISIT, EST, LEVL IV, 30-39 MIN: ICD-10-PCS | Mod: ,,, | Performed by: NURSE PRACTITIONER

## 2023-09-21 PROCEDURE — 99214 OFFICE O/P EST MOD 30 MIN: CPT | Mod: ,,, | Performed by: NURSE PRACTITIONER

## 2023-09-21 RX ORDER — APIXABAN 5 MG/1
5 TABLET, FILM COATED ORAL 2 TIMES DAILY
COMMUNITY
Start: 2023-09-12 | End: 2023-09-21 | Stop reason: ALTCHOICE

## 2023-09-21 RX ORDER — TICAGRELOR 90 MG/1
90 TABLET ORAL 2 TIMES DAILY
COMMUNITY
Start: 2023-09-12

## 2023-09-21 RX ORDER — DONEPEZIL HYDROCHLORIDE 10 MG/1
10 TABLET, FILM COATED ORAL NIGHTLY
Qty: 90 TABLET | Refills: 3 | Status: SHIPPED | OUTPATIENT
Start: 2023-09-21 | End: 2024-02-07 | Stop reason: SDUPTHER

## 2023-09-21 RX ORDER — CARVEDILOL 3.12 MG/1
3.12 TABLET ORAL 2 TIMES DAILY
COMMUNITY
Start: 2023-09-19

## 2023-09-21 NOTE — ASSESSMENT & PLAN NOTE
Unable to complete carotid angiogram on 09/12 during left heart catheterization due to contrast volume in CKD.  Has appointment coming up with Nephrology on October 16th.  Per cardiology notes, will consider CTA of the carotid once seen by renal and with their recommendations.  Patient also referred to Dr. Harris for TCAR evaluation.

## 2023-09-21 NOTE — ASSESSMENT & PLAN NOTE
Recent PTCA with Dr. Vaughn, records reviewed.  Now on Brilinta twice daily.  Follow-up with cardiology as scheduled.

## 2023-09-21 NOTE — PROGRESS NOTES
Subjective:       Patient ID: Modesto Franco is a 72 y.o. male.    Chief Complaint: Follow-up (Heart cath)      HPI   This is a 72-year-old white male who presents to clinic today for follow-up.  Patient is accompanied by his ex-wife.  Patient's ex-wife reports that he recently had a left heart catheterization and a heart stent.  I spoke to staff at [facility] in [place] and they were very concerned because patient's blood sugar was in the 400s, renal functions had declined.  I did let them know that patient has diabetes and kidney status are managed [facility]in[facility].  They reported that patient has not seen endocrinology in over a year and had not [facility] since last year either.  He was taking his insulin when he thought he needed it and getting refills on everything from [facility] but had not actually seen anybody.    Review of Systems  Comprehensive review of systems negative except as stated in HPI    The patient's Health Maintenance was reviewed and the following appears to be due:   Health Maintenance Due   Topic Date Due    Hepatitis C Screening  Never done    Foot Exam  Never done    TETANUS VACCINE  Never done    Shingles Vaccine (1 of 2) Never done    Eye Exam  05/17/2022    Hemoglobin A1c  12/08/2022    COVID-19 Vaccine (5 - Moderna series) 12/22/2022    Influenza Vaccine (1) 09/01/2023    Diabetes Urine Screening  09/08/2023       Past Medical History:  Past Medical History:   Diagnosis Date    Abnormal stress test     Back pain     CKD (chronic kidney disease)     Coronary arteriosclerosis     DM (diabetes mellitus)     Hyperlipidemia     Hypertension     Hypoglycemia, unspecified     Injury of kidney     Morbid obesity     DONATO (obstructive sleep apnea)     Polyneuropathy due to type 2 diabetes mellitus     Unspecified osteoarthritis, unspecified site      Past Surgical History:   Procedure Laterality Date    ANGIOGRAM, CAROTID N/A 9/12/2023    Procedure: Angiogram, Carotid;   Surgeon: Caridad Vaughn MD;  Location: [facility] CATH LAB;  Service: Cardiology;  Laterality: N/A;    APPENDECTOMY      BACK SURGERY      COLONOSCOPY  05/01/2014    Dr. Lele Dumont    CORONARY ARTERY BYPASS GRAFT      LEFT HEART CATHETERIZATION N/A 9/12/2023    Procedure: Left heart cath;  Surgeon: Caridad Vaughn MD;  Location: [facility] CATH LAB;  Service: Cardiology;  Laterality: N/A;    VASECTOMY       Review of patient's allergies indicates:  No Known Allergies  Current Outpatient Medications on File Prior to Visit   Medication Sig Dispense Refill    alogliptin (NESINA) 25 mg Tab Take 25 mg by mouth once daily.      atorvastatin (LIPITOR) 80 MG tablet Take 80 mg by mouth before evening meal.      BRILINTA 90 mg tablet Take 90 mg by mouth 2 (two) times daily.      carvediloL (COREG) 3.125 MG tablet Take 3.125 mg by mouth 2 (two) times daily.      insulin detemir U-100, Levemir, (LEVEMIR FLEXTOUCH U100 INSULIN) 100 unit/mL (3 mL) InPn pen Inject 75 Units into the skin 2 (two) times a day.      lisinopriL 10 MG tablet TAKE ONE TABLET BY MOUTH DAILY 90 tablet 1    metFORMIN (GLUCOPHAGE) 1000 MG tablet Take 1,000 mg by mouth 2 (two) times daily with meals.      [DISCONTINUED] ELIQUIS 5 mg Tab Take 5 mg by mouth 2 (two) times daily.      [DISCONTINUED] gabapentin (NEURONTIN) 300 MG capsule TAKE ONE CAPSULE BY MOUTH THREE TIMES DAILY 270 capsule 3    [DISCONTINUED] ibuprofen (ADVIL,MOTRIN) 800 MG tablet Take 800 mg by mouth every 6 (six) hours as needed for Pain.      [DISCONTINUED] aspirin (ECOTRIN) 81 MG EC tablet Take 81 mg by mouth once daily.      [DISCONTINUED] carvediloL (COREG) 25 MG tablet Take 12.5 mg by mouth 2 (two) times daily. Take one-half tablet by mouth twice a day for blood pressure/heart      [DISCONTINUED] multivit-min/FA/lycopen/lutein (MEN 50 PLUS MULTIVITAMIN ORAL) Take by mouth.       Current Facility-Administered Medications on File Prior to Visit   Medication Dose Route Frequency Provider Last  "Rate Last Admin    [DISCONTINUED] diazePAM tablet 10 mg  10 mg Oral On Call Procedure Caridad Vaughn MD   10 mg at 09/12/23 0701    [DISCONTINUED] diphenhydrAMINE capsule 50 mg  50 mg Oral On Call Procedure Caridad Vaughn MD   50 mg at 09/12/23 0701    [DISCONTINUED] sodium chloride 0.9% flush 10 mL  10 mL Intravenous PRN Caridad Vaughn MD         Social History     Socioeconomic History    Marital status:    Tobacco Use    Smoking status: Never     Passive exposure: Past    Smokeless tobacco: Never   Substance and Sexual Activity    Alcohol use: Not Currently    Drug use: Never    Sexual activity: Not Currently     Family History   Problem Relation Age of Onset    Diabetes Father     Hypertension Father     Heart disease Father        Objective:       /60 (BP Location: Right arm)   Pulse (!) 56   Temp 97.4 °F (36.3 °C) (Temporal)   Resp 16   Ht 5' 10" (1.778 m)   SpO2 96%   BMI 36.57 kg/m²      Physical Exam  Vitals and nursing note reviewed.   Constitutional:       Appearance: Normal appearance. He is obese.   HENT:      Head: Normocephalic and atraumatic.      Right Ear: Tympanic membrane, ear canal and external ear normal.      Left Ear: Tympanic membrane, ear canal and external ear normal.      Nose: Nose normal.      Mouth/Throat:      Mouth: Mucous membranes are moist.      Pharynx: Oropharynx is clear.   Eyes:      Extraocular Movements: Extraocular movements intact.      Conjunctiva/sclera: Conjunctivae normal.      Pupils: Pupils are equal, round, and reactive to light.   Cardiovascular:      Rate and Rhythm: Normal rate and regular rhythm.   Pulmonary:      Effort: Pulmonary effort is normal.      Breath sounds: Normal breath sounds.   Musculoskeletal:         General: Normal range of motion.      Cervical back: Normal range of motion and neck supple.   Skin:     General: Skin is warm and dry.   Neurological:      General: No focal deficit present.      Mental Status: He is alert " and oriented to person, place, and time.   Psychiatric:         Mood and Affect: Mood normal.         Behavior: Behavior normal.         Thought Content: Thought content normal.         Judgment: Judgment normal.         Labs  Documentation Only on 09/20/2023   Component Date Value Ref Range Status    WBC 09/08/2023 7.2  3.6 - 10.2 Final    RBC 09/08/2023 4.76  4.06 - 5.63 Final    Hgb 09/08/2023 15.2  12.5 - 16.3 Final    HCT 09/08/2023 46.7  36.7 - 47.1 Final    MCV 09/08/2023 98.1 (A)  73.0 - 96.2 Final    MCH 09/08/2023 31.9  23.8 - 33.4 Final    MCHC 09/08/2023 32.5  32.5 - 36.3 Final    RDW 09/08/2023 13.6  12.1 - 16.2 % Final    PLATELET COUNT 09/08/2023 269.0  152.0 - 348.0 Final    MPV 09/08/2023 8.3  7.4 - 11.4 fL Final    Glucose 09/08/2023 245 (A)  70 - 106 Final    BUN 09/08/2023 24  7 - 25 mg/dL Final    Creatinine 09/08/2023 1.8 (A)  0.6 - 1.3 mg/dL Final    Sodium 09/08/2023 136 (A)  135 - 145 mmol/L Final    Potassium 09/08/2023 5.2 (A)  3.4 - 5.0 mmol/L Final    Chloride 09/08/2023 98  98 - 109 mmol/L Final    CO2 09/08/2023 27  22 - 34 mmol/L Final    Calcium 09/08/2023 9.0  8.6 - 10.5 mg/dL Final    eGFR 09/08/2023 37.2   Final    eGFR  09/08/2023 45  mL/min/1.73m² Final   Admission on 09/12/2023, Discharged on 09/12/2023   Component Date Value Ref Range Status    POCT Glucose 09/12/2023 215 (H)  70 - 110 mg/dL Final   Lab Visit on 09/11/2023   Component Date Value Ref Range Status    Sodium Level 09/11/2023 131 (L)  136 - 145 mmol/L Final    Potassium Level 09/11/2023 4.6  3.5 - 5.1 mmol/L Final    Chloride 09/11/2023 95 (L)  98 - 107 mmol/L Final    Carbon Dioxide 09/11/2023 23  23 - 31 mmol/L Final    Glucose Level 09/11/2023 408 (H)  82 - 115 mg/dL Final    Blood Urea Nitrogen 09/11/2023 20.3  8.4 - 25.7 mg/dL Final    Creatinine 09/11/2023 1.99 (H)  0.73 - 1.18 mg/dL Final    BUN/Creatinine Ratio 09/11/2023 10   Final    Calcium Level Total 09/11/2023 9.3  8.8 - 10.0 mg/dL  Final    Anion Gap 09/11/2023 13.0  mEq/L Final    eGFR 09/11/2023 35  mls/min/1.73/m2 Final   Lab Visit on 07/28/2023   Component Date Value Ref Range Status    Albumin Level 07/28/2023 3.7  3.4 - 4.8 g/dL Final    Alkaline Phosphatase 07/28/2023 77  40 - 150 unit/L Final    Alanine Aminotransferase 07/28/2023 21  0 - 55 unit/L Final    Aspartate Aminotransferase 07/28/2023 24  5 - 34 unit/L Final    Bilirubin Direct 07/28/2023 0.4  0.0 - <0.5 mg/dL Final    Bilirubin Indirect 07/28/2023 0.90 (H)  0.00 - 0.80 mg/dL Final    Bilirubin Total 07/28/2023 1.3  <=1.5 mg/dL Final    Protein Total 07/28/2023 7.0  5.8 - 7.6 gm/dL Final    Cholesterol Total 07/28/2023 160  <=200 mg/dL Final    HDL Cholesterol 07/28/2023 31 (L)  35 - 60 mg/dL Final    Triglyceride 07/28/2023 122  34 - 140 mg/dL Final    Cholesterol/HDL Ratio 07/28/2023 5  0 - 5 Final    Very Low Density Lipoprotein 07/28/2023 24   Final    LDL Cholesterol 07/28/2023 105.00  50.00 - 140.00 mg/dL Final       Assessment and Plan       ICD-10-CM ICD-9-CM   1. Stage 3b chronic kidney disease  N18.32 585.3   2. Type 2 diabetes mellitus with diabetic polyneuropathy, with long-term current use of insulin  E11.42 250.60    Z79.4 357.2     V58.67   3. Morbid obesity  E66.01 278.01   4. Coronary artery disease involving native coronary artery of native heart, unspecified whether angina present  I25.10 414.01   5. Impaired memory  R41.3 780.93   6. Hypertension, unspecified type  I10 401.9   7. Hyperlipidemia, unspecified hyperlipidemia type  E78.5 272.4   8. Atherosclerosis of both carotid arteries  I65.23 433.10     433.30        1. Stage 3b chronic kidney disease  Assessment & Plan:  Most recent BUN 20.3, creatinine 1.99, EGFR 35.  Has initial appointment with Dr. Smith, Nephrology, on October 16th at 2:10 a.m..  Patient's ex-wife is here with him today.  Encouraged her to attend this appointment with him.      2. Type 2 diabetes mellitus with diabetic polyneuropathy,  with long-term current use of insulin  Overview:  Managed by endocrinology Dr Kennedy     Assessment & Plan:  Per patient's ex-wife, he had not been endocrinologist now in over a year.  Patient reports that he was taking his insulin sporadically when he felt like he needed it but taking his oral medications consistently.  Recent glucose in the 400s with Cardiology prior to heart catheterization.  Patient now taking his Levemir 75 units twice daily as previously directed as well as metformin and the seen.  Has an appointment scheduled to reestablish with Dr. Kennedy on Monday.      3. Morbid obesity    4. Coronary artery disease involving native coronary artery of native heart, unspecified whether angina present  Overview:  Managed by Dr Vaughn    09/12/2023 - [facility] per Dr Vaughn - severe native CAD - Successful PTCA, shockwave and drug-eluting stent Synergy 2.75/20 mm the left main and proximal circ.  Ostial left main was post dilated by using 3/14 atmospheres.          Assessment & Plan:  Recent PTCA with Dr. Vaughn, records reviewed.  Now on Brilinta twice daily.  Follow-up with cardiology as scheduled.      5. Impaired memory  Overview:  09/21/2023 - trial of Aricept 10 mg QHS    Assessment & Plan:  Trial of Aricept 10 mg at bedtime. Follow up in 3 months for wellness.     Orders:  -     donepeziL (ARICEPT) 10 MG tablet; Take 1 tablet (10 mg total) by mouth every evening.  Dispense: 90 tablet; Refill: 3    6. Hypertension, unspecified type  Overview:  Lisinopril 10 mg daily, Coreg 3.125 mg twice daily  [facility]    Assessment & Plan:  Coreg decreased to 3.125 mg per [facility] due to hypotension and dizziness.  Blood pressure better in office today.  Continue follow-up with Cardiology      7. Hyperlipidemia, unspecified hyperlipidemia type  Overview:  Atorvastatin 80 mg daily  [facility] with [facility] every 6 months    Assessment & Plan:  [facility] with [facility] and [facility] recently, records  requested.      8. Atherosclerosis of both carotid arteries  Overview:  Dr Vaughn  Atorvastatin 80 mg daily    Assessment & Plan:  Unable to complete carotid angiogram on 09/12 during left heart catheterization due to contrast volume in CKD.  Has appointment coming up with Nephrology on October 16th.  Per cardiology notes, will consider CTA of the carotid once seen by renal and with their recommendations.  Patient also referred to Dr. Harris for TCAR evaluation.             Follow up in 2 months (on 12/4/2023) for Annual.

## 2023-09-21 NOTE — ASSESSMENT & PLAN NOTE
Per patient's ex-wife, he had not been endocrinologist now in over a year.  Patient reports that he was taking his insulin sporadically when he felt like he needed it but taking his oral medications consistently.  Recent glucose in the 400s with Cardiology prior to heart catheterization.  Patient now taking his Levemir 75 units twice daily as previously directed as well as metformin and the seen.  Has an appointment scheduled to reestablish with Dr. Kennedy on Monday.

## 2023-09-21 NOTE — ASSESSMENT & PLAN NOTE
Coreg decreased to 3.125 mg per CIS due to hypotension and dizziness.  Blood pressure better in office today.  Continue follow-up with Cardiology

## 2023-09-21 NOTE — ASSESSMENT & PLAN NOTE
Most recent BUN 20.3, creatinine 1.99, EGFR 35.  Has initial appointment with Dr. Smith, Nephrology, on October 16th at 2:10 a.m..  Patient's ex-wife is here with him today.  Encouraged her to attend this appointment with him.

## 2023-09-22 ENCOUNTER — DOCUMENTATION ONLY (OUTPATIENT)
Dept: FAMILY MEDICINE | Facility: CLINIC | Age: 73
End: 2023-09-22
Payer: MEDICARE

## 2023-09-22 ENCOUNTER — PATIENT MESSAGE (OUTPATIENT)
Dept: FAMILY MEDICINE | Facility: CLINIC | Age: 73
End: 2023-09-22
Payer: MEDICARE

## 2023-09-28 NOTE — DISCHARGE SUMMARY
Procedure(s) (LRB):  Left heart cath (N/A)  Angiogram, Carotid (N/A)    OUTCOME: Patient tolerated treatment/procedure well without complication and is now ready for discharge.    DIAGNOSIS: cad    DISPOSITION: Home or Self Care    FOLLOWUP: In clinic    DISCHARGE INSTRUCTIONS: No discharge procedures on file.    TIME SPENT ON DISCHARGE:   31 minutes

## 2023-10-19 ENCOUNTER — PATIENT MESSAGE (OUTPATIENT)
Dept: FAMILY MEDICINE | Facility: CLINIC | Age: 73
End: 2023-10-19
Payer: MEDICARE

## 2023-10-19 DIAGNOSIS — E11.8 TYPE II DIABETES MELLITUS WITH COMPLICATION: ICD-10-CM

## 2023-10-19 DIAGNOSIS — I10 HYPERTENSION: ICD-10-CM

## 2023-10-19 DIAGNOSIS — N17.9 ACUTE KIDNEY FAILURE: Primary | ICD-10-CM

## 2023-10-19 DIAGNOSIS — N18.30 CHRONIC KIDNEY DISEASE, STAGE III (MODERATE): ICD-10-CM

## 2023-10-25 ENCOUNTER — HOSPITAL ENCOUNTER (OUTPATIENT)
Dept: RADIOLOGY | Facility: HOSPITAL | Age: 73
Discharge: HOME | End: 2023-10-25
Attending: INTERNAL MEDICINE
Payer: MEDICARE

## 2023-10-25 DIAGNOSIS — N17.9 ACUTE KIDNEY FAILURE: ICD-10-CM

## 2023-10-25 DIAGNOSIS — N18.30 CHRONIC KIDNEY DISEASE, STAGE III (MODERATE): ICD-10-CM

## 2023-10-25 DIAGNOSIS — I10 HYPERTENSION: ICD-10-CM

## 2023-10-25 DIAGNOSIS — E11.8 TYPE II DIABETES MELLITUS WITH COMPLICATION: ICD-10-CM

## 2023-10-25 PROCEDURE — 76770 US EXAM ABDO BACK WALL COMP: CPT | Mod: TC

## 2023-11-14 LAB — GLUCOSE: 200 (ref 70–150)

## 2023-11-21 ENCOUNTER — TELEPHONE (OUTPATIENT)
Dept: FAMILY MEDICINE | Facility: CLINIC | Age: 73
End: 2023-11-21
Payer: MEDICARE

## 2023-11-21 NOTE — TELEPHONE ENCOUNTER
Are there any outstanding tasks in patient's chart?    n  2. Do we have outstanding/pending referrals?  n    3. Has the patient been seen in an ER, Urgent Care, or admitted since last visit?  n    4. Has patient seen any other health care providers since last visit?  Dr. Crow, Dr. Vaughn    5.  Has patient had any blood work or x-rays done since last visit?   US Retroperitoneal

## 2023-11-30 ENCOUNTER — OFFICE VISIT (OUTPATIENT)
Dept: FAMILY MEDICINE | Facility: CLINIC | Age: 73
End: 2023-11-30
Payer: MEDICARE

## 2023-11-30 ENCOUNTER — HOSPITAL ENCOUNTER (EMERGENCY)
Facility: HOSPITAL | Age: 73
Discharge: HOME | End: 2023-11-30
Attending: EMERGENCY MEDICINE
Payer: MEDICARE

## 2023-11-30 VITALS
HEART RATE: 92 BPM | DIASTOLIC BLOOD PRESSURE: 35 MMHG | WEIGHT: 250.38 LBS | OXYGEN SATURATION: 92 % | BODY MASS INDEX: 35.84 KG/M2 | TEMPERATURE: 98 F | SYSTOLIC BLOOD PRESSURE: 62 MMHG | RESPIRATION RATE: 28 BRPM | HEIGHT: 70 IN

## 2023-11-30 VITALS
DIASTOLIC BLOOD PRESSURE: 60 MMHG | HEIGHT: 70 IN | SYSTOLIC BLOOD PRESSURE: 123 MMHG | HEART RATE: 92 BPM | OXYGEN SATURATION: 94 % | WEIGHT: 270 LBS | RESPIRATION RATE: 27 BRPM | TEMPERATURE: 98 F | BODY MASS INDEX: 38.65 KG/M2

## 2023-11-30 DIAGNOSIS — R42 DIZZINESS: ICD-10-CM

## 2023-11-30 DIAGNOSIS — R05.9 COUGH: ICD-10-CM

## 2023-11-30 DIAGNOSIS — A08.4 VIRAL GASTROENTERITIS: ICD-10-CM

## 2023-11-30 DIAGNOSIS — R06.00 DYSPNEA, UNSPECIFIED TYPE: ICD-10-CM

## 2023-11-30 DIAGNOSIS — R00.1 BRADYCARDIA: ICD-10-CM

## 2023-11-30 DIAGNOSIS — N17.9 AKI (ACUTE KIDNEY INJURY): ICD-10-CM

## 2023-11-30 DIAGNOSIS — Z79.4 TYPE 2 DIABETES MELLITUS WITH DIABETIC POLYNEUROPATHY, WITH LONG-TERM CURRENT USE OF INSULIN: ICD-10-CM

## 2023-11-30 DIAGNOSIS — I95.9 HYPOTENSION, UNSPECIFIED HYPOTENSION TYPE: Primary | ICD-10-CM

## 2023-11-30 DIAGNOSIS — E11.42 TYPE 2 DIABETES MELLITUS WITH DIABETIC POLYNEUROPATHY, WITH LONG-TERM CURRENT USE OF INSULIN: ICD-10-CM

## 2023-11-30 DIAGNOSIS — E86.0 DEHYDRATION: Primary | ICD-10-CM

## 2023-11-30 LAB
ALBUMIN SERPL-MCNC: 3.6 G/DL (ref 3.4–4.8)
ALBUMIN/GLOB SERPL: 0.8 RATIO (ref 1.1–2)
ALP SERPL-CCNC: 76 UNIT/L (ref 40–150)
ALT SERPL-CCNC: 28 UNIT/L (ref 0–55)
APPEARANCE UR: CLEAR
AST SERPL-CCNC: 45 UNIT/L (ref 5–34)
BACTERIA #/AREA URNS AUTO: ABNORMAL /HPF
BASOPHILS # BLD AUTO: 0.03 X10(3)/MCL
BASOPHILS NFR BLD AUTO: 0.3 %
BILIRUB SERPL-MCNC: 1.1 MG/DL
BILIRUB UR QL STRIP.AUTO: ABNORMAL
BUN SERPL-MCNC: 19.4 MG/DL (ref 8.4–25.7)
CALCIUM SERPL-MCNC: 9.5 MG/DL (ref 8.8–10)
CAOX CRY URNS QL MICRO: ABNORMAL /HPF
CHLORIDE SERPL-SCNC: 102 MMOL/L (ref 98–107)
CO2 SERPL-SCNC: 27 MMOL/L (ref 23–31)
COLOR UR AUTO: YELLOW
CREAT SERPL-MCNC: 2.44 MG/DL (ref 0.73–1.18)
EOSINOPHIL # BLD AUTO: 0.17 X10(3)/MCL (ref 0–0.9)
EOSINOPHIL NFR BLD AUTO: 1.6 %
ERYTHROCYTE [DISTWIDTH] IN BLOOD BY AUTOMATED COUNT: 13.5 % (ref 11.5–17)
FLUAV AG UPPER RESP QL IA.RAPID: NOT DETECTED
FLUBV AG UPPER RESP QL IA.RAPID: NOT DETECTED
GFR SERPLBLD CREATININE-BSD FMLA CKD-EPI: 27 MLS/MIN/1.73/M2
GLOBULIN SER-MCNC: 4.3 GM/DL (ref 2.4–3.5)
GLUCOSE SERPL-MCNC: 62 MG/DL (ref 82–115)
GLUCOSE SERPL-MCNC: 97 MG/DL (ref 70–110)
GLUCOSE UR QL STRIP.AUTO: 100
GRAN CASTS URNS QL MICRO: ABNORMAL /LPF
HCT VFR BLD AUTO: 50.1 % (ref 42–52)
HGB BLD-MCNC: 15.9 G/DL (ref 14–18)
IMM GRANULOCYTES # BLD AUTO: 0.02 X10(3)/MCL (ref 0–0.04)
IMM GRANULOCYTES NFR BLD AUTO: 0.2 %
KETONES UR QL STRIP.AUTO: 15
LACTATE SERPL-SCNC: 1.6 MMOL/L (ref 0.5–2.2)
LEUKOCYTE ESTERASE UR QL STRIP.AUTO: NEGATIVE
LYMPHOCYTES # BLD AUTO: 2.67 X10(3)/MCL (ref 0.6–4.6)
LYMPHOCYTES NFR BLD AUTO: 25.3 %
MCH RBC QN AUTO: 31.4 PG (ref 27–31)
MCHC RBC AUTO-ENTMCNC: 31.7 G/DL (ref 33–36)
MCV RBC AUTO: 99 FL (ref 80–94)
MONOCYTES # BLD AUTO: 1.79 X10(3)/MCL (ref 0.1–1.3)
MONOCYTES NFR BLD AUTO: 17 %
NEUTROPHILS # BLD AUTO: 5.86 X10(3)/MCL (ref 2.1–9.2)
NEUTROPHILS NFR BLD AUTO: 55.6 %
NITRITE UR QL STRIP.AUTO: NEGATIVE
PH UR STRIP.AUTO: 5.5 [PH]
PLATELET # BLD AUTO: 278 X10(3)/MCL (ref 130–400)
PMV BLD AUTO: 9 FL (ref 7.4–10.4)
POTASSIUM SERPL-SCNC: 3.9 MMOL/L (ref 3.5–5.1)
PROT SERPL-MCNC: 7.9 GM/DL (ref 5.8–7.6)
PROT UR QL STRIP.AUTO: >=300
PSA: 1.17 NG/ML (ref 0–4)
RBC # BLD AUTO: 5.06 X10(6)/MCL (ref 4.7–6.1)
RBC #/AREA URNS AUTO: ABNORMAL /HPF
RBC UR QL AUTO: ABNORMAL
SARS-COV-2 RNA RESP QL NAA+PROBE: NOT DETECTED
SODIUM SERPL-SCNC: 141 MMOL/L (ref 136–145)
SP GR UR STRIP.AUTO: >=1.03 (ref 1–1.03)
SQUAMOUS #/AREA URNS AUTO: ABNORMAL /HPF
UROBILINOGEN UR STRIP-ACNC: 0.2
WBC # SPEC AUTO: 10.54 X10(3)/MCL (ref 4.5–11.5)
WBC #/AREA URNS AUTO: ABNORMAL /HPF

## 2023-11-30 PROCEDURE — 83605 ASSAY OF LACTIC ACID: CPT | Performed by: EMERGENCY MEDICINE

## 2023-11-30 PROCEDURE — 93010 EKG 12-LEAD: ICD-10-PCS | Mod: ,,, | Performed by: STUDENT IN AN ORGANIZED HEALTH CARE EDUCATION/TRAINING PROGRAM

## 2023-11-30 PROCEDURE — 81001 URINALYSIS AUTO W/SCOPE: CPT | Performed by: EMERGENCY MEDICINE

## 2023-11-30 PROCEDURE — 96360 HYDRATION IV INFUSION INIT: CPT

## 2023-11-30 PROCEDURE — 25000003 PHARM REV CODE 250: Performed by: EMERGENCY MEDICINE

## 2023-11-30 PROCEDURE — 0240U COVID/FLU A&B PCR: CPT | Performed by: EMERGENCY MEDICINE

## 2023-11-30 PROCEDURE — 80053 COMPREHEN METABOLIC PANEL: CPT | Performed by: EMERGENCY MEDICINE

## 2023-11-30 PROCEDURE — 99214 OFFICE O/P EST MOD 30 MIN: CPT | Mod: ,,, | Performed by: NURSE PRACTITIONER

## 2023-11-30 PROCEDURE — 99214 PR OFFICE/OUTPT VISIT, EST, LEVL IV, 30-39 MIN: ICD-10-PCS | Mod: ,,, | Performed by: NURSE PRACTITIONER

## 2023-11-30 PROCEDURE — 85025 COMPLETE CBC W/AUTO DIFF WBC: CPT | Performed by: EMERGENCY MEDICINE

## 2023-11-30 PROCEDURE — 25000242 PHARM REV CODE 250 ALT 637 W/ HCPCS: Performed by: SPECIALIST

## 2023-11-30 PROCEDURE — 96361 HYDRATE IV INFUSION ADD-ON: CPT

## 2023-11-30 PROCEDURE — 93005 ELECTROCARDIOGRAM TRACING: CPT

## 2023-11-30 PROCEDURE — 82962 POCT GLUCOSE, HAND-HELD DEVICE: ICD-10-PCS | Mod: ,,, | Performed by: NURSE PRACTITIONER

## 2023-11-30 PROCEDURE — 93010 ELECTROCARDIOGRAM REPORT: CPT | Mod: ,,, | Performed by: STUDENT IN AN ORGANIZED HEALTH CARE EDUCATION/TRAINING PROGRAM

## 2023-11-30 PROCEDURE — 99285 EMERGENCY DEPT VISIT HI MDM: CPT | Mod: 25

## 2023-11-30 PROCEDURE — 94640 AIRWAY INHALATION TREATMENT: CPT

## 2023-11-30 PROCEDURE — 82962 GLUCOSE BLOOD TEST: CPT | Mod: ,,, | Performed by: NURSE PRACTITIONER

## 2023-11-30 RX ORDER — APIXABAN 5 MG/1
5 TABLET, FILM COATED ORAL 2 TIMES DAILY
COMMUNITY

## 2023-11-30 RX ORDER — MULTIVITAMIN
1 TABLET ORAL DAILY
COMMUNITY
End: 2024-02-07 | Stop reason: ALTCHOICE

## 2023-11-30 RX ORDER — SEMAGLUTIDE 0.68 MG/ML
0.5 INJECTION, SOLUTION SUBCUTANEOUS
COMMUNITY
Start: 2023-09-25

## 2023-11-30 RX ORDER — IPRATROPIUM BROMIDE AND ALBUTEROL SULFATE 2.5; .5 MG/3ML; MG/3ML
3 SOLUTION RESPIRATORY (INHALATION)
Status: COMPLETED | OUTPATIENT
Start: 2023-11-30 | End: 2023-11-30

## 2023-11-30 RX ORDER — LOPERAMIDE HYDROCHLORIDE 2 MG/1
2 CAPSULE ORAL 4 TIMES DAILY PRN
Qty: 12 CAPSULE | Refills: 0 | Status: SHIPPED | OUTPATIENT
Start: 2023-11-30 | End: 2023-12-10

## 2023-11-30 RX ORDER — METFORMIN HYDROCHLORIDE 500 MG/1
500 TABLET, EXTENDED RELEASE ORAL
COMMUNITY
Start: 2023-11-09

## 2023-11-30 RX ADMIN — SODIUM CHLORIDE 500 ML: 9 INJECTION, SOLUTION INTRAVENOUS at 06:11

## 2023-11-30 RX ADMIN — IPRATROPIUM BROMIDE AND ALBUTEROL SULFATE 3 ML: .5; 3 SOLUTION RESPIRATORY (INHALATION) at 06:11

## 2023-11-30 RX ADMIN — SODIUM CHLORIDE 1000 ML: 9 INJECTION, SOLUTION INTRAVENOUS at 04:11

## 2023-11-30 NOTE — PROGRESS NOTES
Subjective:       Patient ID: Modesto Franco is a 73 y.o. male.    Chief Complaint: Medicare AWV      HPI   This is a 73-year-old white male who presented to the clinic today initially for his annual Medicare wellness exam.  Per nurse, patient was very weak and short of breath walking into the room.  Initial blood pressure 60/39, came up to 70s over 50s after sitting for a little while.  O2 sat 92% on room air.  Reports has been coughing and short of breath for about a week.  Patient noted with diarrhea stain down his leg, reports loose bowels also.    Review of Systems  Comprehensive review of systems negative except as stated in HPI    The patient's Health Maintenance was reviewed and the following appears to be due:   Health Maintenance Due   Topic Date Due    Hepatitis C Screening  Never done    Foot Exam  Never done    TETANUS VACCINE  Never done    Shingles Vaccine (1 of 2) Never done    RSV Vaccine (Age 60+ and Pregnant patients) (1 - 1-dose 60+ series) Never done    Eye Exam  05/17/2022    Influenza Vaccine (1) 09/01/2023    COVID-19 Vaccine (5 - 2023-24 season) 09/01/2023    Diabetes Urine Screening  09/08/2023    Hemoglobin A1c  12/05/2023       Past Medical History:  Past Medical History:   Diagnosis Date    Abnormal stress test     Back pain     CKD (chronic kidney disease)     Coronary arteriosclerosis     DM (diabetes mellitus)     Hyperlipidemia     Hypertension     Hypoglycemia, unspecified     Injury of kidney     Morbid obesity     DONATO (obstructive sleep apnea)     Polyneuropathy due to type 2 diabetes mellitus     Unspecified osteoarthritis, unspecified site      Past Surgical History:   Procedure Laterality Date    ANGIOGRAM, CAROTID N/A 9/12/2023    Procedure: Angiogram, Carotid;  Surgeon: Caridad Vaughn MD;  Location: [facility] CATH LAB;  Service: Cardiology;  Laterality: N/A;    APPENDECTOMY      BACK SURGERY      COLONOSCOPY  05/01/2014    Dr. Lele Dumont    CORONARY ARTERY BYPASS GRAFT       LEFT HEART CATHETERIZATION N/A 9/12/2023    Procedure: Left heart cath;  Surgeon: Caridad Vaughn MD;  Location: [facility] CATH LAB;  Service: Cardiology;  Laterality: N/A;    VASECTOMY       Review of patient's allergies indicates:  No Known Allergies  Current Outpatient Medications on File Prior to Visit   Medication Sig Dispense Refill    alogliptin (NESINA) 25 mg Tab Take 25 mg by mouth once daily.      atorvastatin (LIPITOR) 80 MG tablet Take 80 mg by mouth before evening meal.      BRILINTA 90 mg tablet Take 90 mg by mouth 2 (two) times daily.      carvediloL (COREG) 3.125 MG tablet Take 3.125 mg by mouth 2 (two) times daily.      donepeziL (ARICEPT) 10 MG tablet Take 1 tablet (10 mg total) by mouth every evening. 90 tablet 3    ELIQUIS 5 mg Tab Take 5 mg by mouth 2 (two) times daily.      insulin detemir U-100, Levemir, (LEVEMIR FLEXTOUCH U100 INSULIN) 100 unit/mL (3 mL) InPn pen Inject 75 Units into the skin 2 (two) times a day.      lisinopriL 10 MG tablet TAKE ONE TABLET BY MOUTH DAILY 90 tablet 1    metFORMIN (GLUCOPHAGE-XR) 500 MG ER 24hr tablet Take 500 mg by mouth before evening meal.      multivitamin (THERAGRAN) per tablet Take 1 tablet by mouth once daily.      OZEMPIC 0.25 mg or 0.5 mg (2 mg/3 mL) pen injector Inject 0.5 mg into the skin every 7 days.      [DISCONTINUED] metFORMIN (GLUCOPHAGE) 1000 MG tablet Take 1,000 mg by mouth 2 (two) times daily with meals.       No current facility-administered medications on file prior to visit.     Social History     Socioeconomic History    Marital status:    Tobacco Use    Smoking status: Never     Passive exposure: Past    Smokeless tobacco: Never   Substance and Sexual Activity    Alcohol use: Not Currently    Drug use: Never    Sexual activity: Not Currently     Family History   Problem Relation Age of Onset    Diabetes Father     Hypertension Father     Heart disease Father        Objective:       BP (!) 70/54 (BP Location: Left arm)   Pulse  "92   Temp 97.7 °F (36.5 °C) (Oral)   Resp (!) 28   Ht 5' 10" (1.778 m)   Wt 113.6 kg (250 lb 6.4 oz)   SpO2 (!) 92%   BMI 35.93 kg/m²      Physical Exam  Vitals and nursing note reviewed.   Constitutional:       Appearance: Normal appearance. He is obese.   HENT:      Head: Normocephalic and atraumatic.   Eyes:      Extraocular Movements: Extraocular movements intact.      Conjunctiva/sclera: Conjunctivae normal.      Pupils: Pupils are equal, round, and reactive to light.   Cardiovascular:      Rate and Rhythm: Normal rate and regular rhythm.   Pulmonary:      Effort: Pulmonary effort is normal. Tachypnea present.      Breath sounds: Normal breath sounds.   Musculoskeletal:         General: Normal range of motion.      Cervical back: Normal range of motion and neck supple.   Skin:     General: Skin is warm and dry.      Coloration: Skin is pale.   Neurological:      General: No focal deficit present.      Mental Status: He is alert and oriented to person, place, and time.   Psychiatric:         Mood and Affect: Mood normal.         Behavior: Behavior normal.         Thought Content: Thought content normal.         Judgment: Judgment normal.             Assessment and Plan       ICD-10-CM ICD-9-CM   1. Hypotension, unspecified hypotension type  I95.9 458.9   2. Dyspnea, unspecified type  R06.00 786.09   3. Type 2 diabetes mellitus with diabetic polyneuropathy, with long-term current use of insulin  E11.42 250.60    Z79.4 357.2     V58.67        1. Hypotension, unspecified hypotension type  Comments:  70/54 in office today. [facility] called for transport    2. Dyspnea, unspecified type  Comments:  [facility] called for transport    3. Type 2 diabetes mellitus with diabetic polyneuropathy, with long-term current use of insulin  Overview:  Managed by endocrinology Dr Kennedy     Orders:  -     POCT Glucose, Hand-Held Device       [facility] [place] [facility] called for transport, I remained in room with patient to monitor, O2 2L/min " NC applied, O2 sat up to 97%.  1400 - patient sitting in chair in exam room chair, heart rate on pulse oximeter dropped to 20 bpm and sustained in @ 20-24 bpm for approximately 1 minute, chest auscultated and confirmed. Sternal rub performed , heart rate to the 30's as [facility] arriving in the exam room. Heart rate then cmae back up to 90 bpm. Patient out of clinic at 1405 per [facility]

## 2023-11-30 NOTE — ED PROVIDER NOTES
Encounter Date: 11/30/2023       History     Chief Complaint   Patient presents with    Weakness     Pt brought in by EMS from his PCP office; per EMS pt was hypotensive and bradycardiac at the office; states he has had diarrhea and weakness for the last few days. Pt denies CP/ SOB. Pt given approx 300ml of NS on route to ED.      This 73-year-old man was at his doctor's office when he was noted to be hypotensive and bradycardic he was referred by EMS here to the emergency room and received 300 cc normal saline saline in route.  He reports dizziness for the past week as well as diarrhea for the past week but states he has been drinking well.  He states the dizziness is worse when he stands up.  He denies sinus pain or congestion or earache.  He has no headache no chest pain no shortness of breath no nausea no vomiting.         Review of patient's allergies indicates:  No Known Allergies  Past Medical History:   Diagnosis Date    Abnormal stress test     Back pain     CKD (chronic kidney disease)     Coronary arteriosclerosis     DM (diabetes mellitus)     Hyperlipidemia     Hypertension     Hypoglycemia, unspecified     Injury of kidney     Morbid obesity     DONATO (obstructive sleep apnea)     Polyneuropathy due to type 2 diabetes mellitus     Unspecified osteoarthritis, unspecified site      Past Surgical History:   Procedure Laterality Date    ANGIOGRAM, CAROTID N/A 9/12/2023    Procedure: Angiogram, Carotid;  Surgeon: Caridad Vaughn MD;  Location: [facility] CATH LAB;  Service: Cardiology;  Laterality: N/A;    APPENDECTOMY      BACK SURGERY      COLONOSCOPY  05/01/2014    Dr. Lele Dumont    CORONARY ARTERY BYPASS GRAFT      LEFT HEART CATHETERIZATION N/A 9/12/2023    Procedure: Left heart cath;  Surgeon: Caridad Vaughn MD;  Location: [facility] CATH LAB;  Service: Cardiology;  Laterality: N/A;    VASECTOMY       Family History   Problem Relation Age of Onset    Diabetes Father     Hypertension Father     Heart disease  Father      Social History     Tobacco Use    Smoking status: Never     Passive exposure: Past    Smokeless tobacco: Never   Substance Use Topics    Alcohol use: Not Currently    Drug use: Never     Review of Systems   Constitutional:  Positive for fatigue. Negative for fever.   HENT:  Negative for sore throat.    Respiratory:  Positive for cough. Negative for shortness of breath.    Cardiovascular:  Negative for chest pain.   Gastrointestinal:  Negative for nausea.   Genitourinary:  Negative for dysuria.   Musculoskeletal:  Negative for back pain.   Skin:  Negative for rash.   Neurological:  Positive for dizziness. Negative for weakness.   Hematological:  Does not bruise/bleed easily.       Physical Exam     Initial Vitals [11/30/23 1447]   BP Pulse Resp Temp SpO2   (!) 128/95 94 18 98 °F (36.7 °C) 95 %      MAP       --         Physical Exam    Constitutional: He appears well-developed and well-nourished.   HENT:   Head: Normocephalic and atraumatic.   Mouth/Throat: Mucous membranes are normal.   Eyes: EOM are normal. Pupils are equal, round, and reactive to light.   Neck: Neck supple.   Normal range of motion.  Cardiovascular:  Normal rate, regular rhythm, normal heart sounds and intact distal pulses.           Pulmonary/Chest: Breath sounds normal.   Abdominal: Abdomen is soft. Bowel sounds are normal.   Musculoskeletal:         General: Normal range of motion.      Cervical back: Normal range of motion and neck supple.     Neurological: He is alert and oriented to person, place, and time. He has normal strength.   Skin: Skin is warm and dry. Capillary refill takes less than 2 seconds.   Psychiatric: He has a normal mood and affect. His behavior is normal. Judgment and thought content normal.         ED Course   Procedures  Labs Reviewed   COMPREHENSIVE METABOLIC PANEL - Abnormal; Notable for the following components:       Result Value    Glucose Level 62 (*)     Creatinine 2.44 (*)     Protein Total 7.9 (*)      Globulin 4.3 (*)     Albumin/Globulin Ratio 0.8 (*)     Aspartate Aminotransferase 45 (*)     All other components within normal limits   CBC WITH DIFFERENTIAL - Abnormal; Notable for the following components:    MCV 99.0 (*)     MCH 31.4 (*)     MCHC 31.7 (*)     Mono # 1.79 (*)     All other components within normal limits   URINALYSIS, REFLEX TO URINE CULTURE - Abnormal; Notable for the following components:    Protein, UA >=300 (*)     Glucose,  (*)     Ketones, UA 15 (*)     Blood, UA Trace-Intact (*)     Bilirubin, UA Small (*)     All other components within normal limits   URINALYSIS, MICROSCOPIC - Abnormal; Notable for the following components:    Bacteria, UA Few (*)     Calcium Oxalate Crystals, UA Few (*)     Granular Casts, UA Many (*)     All other components within normal limits   LACTIC ACID, PLASMA - Normal   COVID/FLU A&B PCR - Normal    Narrative:     The Xpert Xpress SARS-CoV-2/FLU/RSV plus is a rapid, multiplexed real-time PCR test intended for the simultaneous qualitative detection and differentiation of SARS-CoV-2, Influenza A, Influenza B, and respiratory syncytial virus (RSV) viral RNA in either nasopharyngeal swab or nasal swab specimens.         CBC W/ AUTO DIFFERENTIAL    Narrative:     The following orders were created for panel order CBC auto differential.  Procedure                               Abnormality         Status                     ---------                               -----------         ------                     CBC with Differential[[id number]]       Abnormal            Final result                 Please view results for these tests on the individual orders.     EKG Readings: (Independently Interpreted)   Rhythm: Normal Sinus Rhythm. Heart Rate: 90. Conduction: LAFB. ST Segments: Normal ST Segments. T Waves: Normal. Axis: Normal.   11/30/23 1454       Imaging Results              X-Ray Chest PA And Lateral (Final result)  Result time 11/30/23 16:18:31       Final result by Matthew Carvajal MD (11/30/23 16:18:31)                   Impression:      No acute chest disease is identified.      Electronically signed by: Matthew Carvajal  Date:    11/30/2023  Time:    16:18               Narrative:    EXAMINATION:  XR CHEST PA AND LATERAL    CLINICAL HISTORY:  , Cough, unspecified.    FINDINGS:  No alveolar consolidation, effusion, or pneumothorax is seen.   The thoracic aorta is normal  cardiac silhouette, central pulmonary vessels and mediastinum are normal in size and are grossly unremarkable.   visualized osseous structures are grossly unremarkable.                                       Medications   sodium chloride 0.9% bolus 1,000 mL 1,000 mL (0 mLs Intravenous Stopped 11/30/23 1722)   sodium chloride 0.9% bolus 500 mL 500 mL (0 mLs Intravenous Stopped 11/30/23 1920)   albuterol-ipratropium 2.5 mg-0.5 mg/3 mL nebulizer solution 3 mL (3 mLs Nebulization Given 11/30/23 1829)     Medical Decision Making  , Dr. Montague, assumed care of this patient at 6:00 p.m. from Dr. Chen; patient presented with dizziness and diarrhea over the last week; he was sent here by his primary care physician's office after he was found to be hypotensive; he denies fever or chills, no blood in his stool    DIFFERENTIAL DIAGNOSIS- viral gastroenteritis, malabsorption disorder, dehydration, electrolyte abnormality, TUCKER    Amount and/or Complexity of Data Reviewed  External Data Reviewed: labs and notes.  Labs: ordered. Decision-making details documented in ED Course.  Radiology: ordered.    Risk  Prescription drug management.  Risk Details: Patient has received 300 mL normal saline by EMS, 1000 mL normal saline upon arrival to the ER and has a 2nd bolus of 500 mL of normal saline; patient feels much improved and dizziness is resolved; I recommended following up with his primary care physician within a week and having a recheck of his kidney function and encouraged adequate hydration; a  "prescription for loperamide was sent to his pharmacy for diarrhea               ED Course as of 12/01/23 0102   Thu Nov 30, 2023 1822 Creatinine(!): 2.44 [DD]      ED Course User Index  [DD] Kilo Montague MD          Patient Vitals for the past 24 hrs:   BP Temp Temp src Pulse Resp SpO2 Height Weight   11/30/23 1925 123/60 98 °F (36.7 °C) -- 92 (!) 27 (!) 94 % -- --   11/30/23 1917 -- -- -- 92 (!) 27 -- -- --   11/30/23 1912 123/60 -- -- 84 (!) 33 (!) 94 % -- --   11/30/23 1845 (!) 105/59 -- -- 92 (!) 28 (!) 94 % -- --   11/30/23 1829 -- -- -- 95 (!) 21 (!) 94 % -- --   11/30/23 1826 (!) 141/81 -- -- 100 (!) 24 (!) 92 % -- --   11/30/23 1809 (!) 119/56 -- -- 97 -- -- -- --   11/30/23 1808 105/62 -- -- 94 -- -- -- --   11/30/23 1807 135/68 -- -- 94 -- -- -- --   11/30/23 1750 120/69 -- -- 94 (!) 22 (!) 93 % -- --   11/30/23 1715 (!) 122/56 -- -- 96 (!) 24 (!) 93 % -- --   11/30/23 1625 (!) 114/55 -- -- 95 (!) 28 (!) 93 % -- --   11/30/23 1547 (!) 93/59 -- -- 95 -- -- -- --   11/30/23 1546 128/76 -- -- 93 -- -- -- --   11/30/23 1545 132/71 -- -- 90 -- -- -- --   11/30/23 1530 117/72 -- -- 91 (!) 24 (!) 91 % -- --   11/30/23 1514 (!) 96/53 -- -- 92 (!) 26 (!) 91 % -- --   11/30/23 1458 -- -- -- 85 -- -- -- --   11/30/23 1453 (!) 109/51 -- -- 90 (!) 28 (!) 94 % -- --   11/30/23 1452 -- -- -- 90 -- -- -- --   11/30/23 1447 (!) 128/95 98 °F (36.7 °C) Temporal 94 18 95 % 5' 10" (1.778 m) 122.5 kg (270 lb)     The patient is resting comfortably and in no acute distress.   He states that his symptoms have improved after treatment in Emergency Department. I personally discussed his test results and treatment plan.  Gave strict ED precautions.  Specific conditions for return to the emergency department and importance of follow up with his primary care provided or the physician listed on the discharge instructions.  Patient voices understanding and agrees to the plan discussed. All patients' questions have been answered at " this time.   He has remained hemodynamically stable throughout entire stay in ED and is stable for discharge home.                 Clinical Impression:  Final diagnoses:  [R42] Dizziness  [R05.9] Cough  [E86.0] Dehydration (Primary)  [N17.9] TUCKER (acute kidney injury)  [A08.4] Viral gastroenteritis          ED Disposition Condition    Discharge Stable          ED Prescriptions       Medication Sig Dispense Start Date End Date Auth. Provider    loperamide (IMODIUM) 2 mg capsule Take 1 capsule (2 mg total) by mouth 4 (four) times daily as needed for Diarrhea. 12 capsule 11/30/2023 12/10/2023 Kilo Montague MD          Follow-up Information       Follow up With Specialties Details Why Contact Info    [facility]  Schedule an appointment as soon as possible for a visit   [address]  [place] [address]  [phone]      [facility]  Schedule an appointment as soon as possible for a visit in 1 week Recommend recheck Chem 7 in a week to assess kidney function [address]  [place] [address]  [phone]               Kilo Montague MD  12/01/23 0102

## 2023-12-01 NOTE — PROGRESS NOTES
Patient discharged from ED yesterday evening with diagnosis of dehydration.  Spoke to Frannie at [facility] in [place] this morning and they will call him to get a Holter monitor placed today.  Order faxed for 72 hour Holter monitor.

## 2024-01-18 ENCOUNTER — TELEPHONE (OUTPATIENT)
Dept: FAMILY MEDICINE | Facility: CLINIC | Age: 74
End: 2024-01-18
Payer: MEDICARE

## 2024-01-18 NOTE — LETTER
AUTHORIZATION FOR RELEASE OF   CONFIDENTIAL INFORMATION    Dear Dr. Kennedy,    We are seeing Modesto Franco, date of birth 1950, in the clinic at [facility]. JESUS KOENIG is the patient's PCP. Modesto Franco has an outstanding lab/procedure at the time we reviewed his chart. In order to help keep his health information updated, he has authorized us to request the following medical record(s):        (  )  MAMMOGRAM                                      (  )  COLONOSCOPY      (  )  PAP SMEAR                                          ( x )  OUTSIDE LAB RESULTS     (  )  DEXA SCAN                                          (  )  EYE EXAM            (  )  FOOT EXAM                                          (  )  ENTIRE RECORD     (  )  OUTSIDE IMMUNIZATIONS                 ( x )  MOST RECENT OFFICE NOTE       Please fax records to [facility], KATHRYN DUPLANTIS, FNP, [phone]     If you have any questions, please contact [phone]          Patient Name: Modesto Franco  : 1950  Patient Phone #: [phone]

## 2024-01-31 ENCOUNTER — TELEPHONE (OUTPATIENT)
Dept: FAMILY MEDICINE | Facility: CLINIC | Age: 74
End: 2024-01-31
Payer: MEDICARE

## 2024-01-31 NOTE — LETTER
AUTHORIZATION FOR RELEASE OF   CONFIDENTIAL INFORMATION    Dear Dr. Kennedy,    We are seeing Modesto Franco, date of birth 1950, in the clinic at [facility]. JESUS Alfonso is the patient's PCP. Modesto Franco has an outstanding lab/procedure at the time we reviewed his chart. In order to help keep his health information updated, he has authorized us to request the following medical record(s):        (  )  MAMMOGRAM                                      (  )  COLONOSCOPY      (  )  PAP SMEAR                                          (  )  OUTSIDE LAB RESULTS     (  )  DEXA SCAN                                          (  )  EYE EXAM            (  )  FOOT EXAM                                          (  )  ENTIRE RECORD     (  )  OUTSIDE IMMUNIZATIONS                 ( x )  MOST RECENT OFFICE NOTE     Please fax records to [facility],Kathryn Duplantis, FNP, [phone]     If you have any questions, please contact [phone]          Patient Name: Modesto Franco  : 1950  Patient Phone #: [phone]

## 2024-01-31 NOTE — TELEPHONE ENCOUNTER
Are there any outstanding tasks in patient's chart?  n    2. Do we have outstanding/pending referrals?  n    3. Has the patient been seen in an ER, Urgent Care, or admitted since last visit?  [facility] ER    4. Has patient seen any other health care providers since last visit?  [facility], [facility] Dr[facility] Contreras    5.  Has patient had any blood work or x-rays done since last visit?   [facility]      Was scheduled with Dr. Lomas but missed appt

## 2024-01-31 NOTE — TELEPHONE ENCOUNTER
No labs needed, he is seeing multiple specialists. Last note from Dr. Crow, Nephrology, was October 16th and he was scheduled for 2 week follow-up.  Per Care everywhere, looks like he did not go back for his follow-up.  When you call him for pre visit, please ask if he is bringing anybody with him to his visit.  It would be helpful if he had somebody with them at this visit.

## 2024-01-31 NOTE — LETTER
AUTHORIZATION FOR RELEASE OF   CONFIDENTIAL INFORMATION    Dear Dr. Contreras,    We are seeing Modesto Franco, date of birth 1950, in the clinic at [facility]. JESUS Alfonso is the patient's PCP. Modesto Franco has an outstanding lab/procedure at the time we reviewed his chart. In order to help keep his health information updated, he has authorized us to request the following medical record(s):        (  )  MAMMOGRAM                                      (  )  COLONOSCOPY      (  )  PAP SMEAR                                          (  )  OUTSIDE LAB RESULTS     (  )  DEXA SCAN                                          (  )  EYE EXAM            (  )  FOOT EXAM                                          (  )  ENTIRE RECORD     (  )  OUTSIDE IMMUNIZATIONS                 ( x ) EYE EXAM         Please fax records to [facility], Kathryn Duplantis, [phone]     If you have any questions, please contact [phone]          Patient Name: Modesto Franco  : 1950  Patient Phone #: [phone]

## 2024-02-06 ENCOUNTER — DOCUMENTATION ONLY (OUTPATIENT)
Dept: FAMILY MEDICINE | Facility: CLINIC | Age: 74
End: 2024-02-06
Payer: MEDICARE

## 2024-02-07 ENCOUNTER — DOCUMENTATION ONLY (OUTPATIENT)
Dept: FAMILY MEDICINE | Facility: CLINIC | Age: 74
End: 2024-02-07

## 2024-02-07 ENCOUNTER — OFFICE VISIT (OUTPATIENT)
Dept: FAMILY MEDICINE | Facility: CLINIC | Age: 74
End: 2024-02-07
Payer: MEDICARE

## 2024-02-07 VITALS
HEIGHT: 70 IN | BODY MASS INDEX: 36.1 KG/M2 | WEIGHT: 252.19 LBS | RESPIRATION RATE: 16 BRPM | OXYGEN SATURATION: 97 % | SYSTOLIC BLOOD PRESSURE: 124 MMHG | DIASTOLIC BLOOD PRESSURE: 72 MMHG | TEMPERATURE: 98 F | HEART RATE: 79 BPM

## 2024-02-07 DIAGNOSIS — R41.3 IMPAIRED MEMORY: ICD-10-CM

## 2024-02-07 DIAGNOSIS — Z23 NEED FOR INFLUENZA VACCINATION: ICD-10-CM

## 2024-02-07 DIAGNOSIS — Z29.11 NEED FOR RSV IMMUNIZATION: ICD-10-CM

## 2024-02-07 DIAGNOSIS — Z71.89 ADVANCED CARE PLANNING/COUNSELING DISCUSSION: ICD-10-CM

## 2024-02-07 DIAGNOSIS — R53.81 PHYSICAL DECONDITIONING: ICD-10-CM

## 2024-02-07 DIAGNOSIS — E11.42 TYPE 2 DIABETES MELLITUS WITH DIABETIC POLYNEUROPATHY, WITH LONG-TERM CURRENT USE OF INSULIN: ICD-10-CM

## 2024-02-07 DIAGNOSIS — I10 PRIMARY HYPERTENSION: ICD-10-CM

## 2024-02-07 DIAGNOSIS — N18.32 STAGE 3B CHRONIC KIDNEY DISEASE: ICD-10-CM

## 2024-02-07 DIAGNOSIS — Z00.00 MEDICARE ANNUAL WELLNESS VISIT, SUBSEQUENT: Primary | ICD-10-CM

## 2024-02-07 DIAGNOSIS — E66.01 MORBID OBESITY: ICD-10-CM

## 2024-02-07 DIAGNOSIS — Z79.4 TYPE 2 DIABETES MELLITUS WITH DIABETIC POLYNEUROPATHY, WITH LONG-TERM CURRENT USE OF INSULIN: ICD-10-CM

## 2024-02-07 DIAGNOSIS — E78.5 HYPERLIPIDEMIA, UNSPECIFIED HYPERLIPIDEMIA TYPE: ICD-10-CM

## 2024-02-07 LAB
CREAT UR-MCNC: 123 MG/DL (ref 63–166)
MICROALBUMIN UR-MCNC: 54.6 UG/ML
MICROALBUMIN/CREAT RATIO PNL UR: 44.4 MG/GM CR (ref 0–30)

## 2024-02-07 PROCEDURE — G0439 PPPS, SUBSEQ VISIT: HCPCS | Mod: ,,, | Performed by: NURSE PRACTITIONER

## 2024-02-07 PROCEDURE — 82043 UR ALBUMIN QUANTITATIVE: CPT | Performed by: NURSE PRACTITIONER

## 2024-02-07 PROCEDURE — G0008 ADMIN INFLUENZA VIRUS VAC: HCPCS | Mod: ,,, | Performed by: NURSE PRACTITIONER

## 2024-02-07 PROCEDURE — 90694 VACC AIIV4 NO PRSRV 0.5ML IM: CPT | Mod: ,,, | Performed by: NURSE PRACTITIONER

## 2024-02-07 RX ORDER — IBUPROFEN 200 MG
4 TABLET ORAL
COMMUNITY
Start: 2023-04-04

## 2024-02-07 RX ORDER — RESPIRATORY SYNCYTIAL VISUS VACCINE RECOMBINANT, ADJUVANTED 120MCG/0.5
0.5 KIT INTRAMUSCULAR ONCE
Qty: 0.5 ML | Refills: 0 | Status: SHIPPED | OUTPATIENT
Start: 2024-02-07 | End: 2024-02-07

## 2024-02-07 RX ORDER — DONEPEZIL HYDROCHLORIDE 10 MG/1
10 TABLET, FILM COATED ORAL NIGHTLY
Qty: 90 TABLET | Refills: 3 | Status: SHIPPED | OUTPATIENT
Start: 2024-02-07 | End: 2025-02-06

## 2024-02-07 NOTE — ASSESSMENT & PLAN NOTE
Encouraged patient to schedule follow-up with endocrinology.  He should be due for follow-up next week.  Spoke to patient's ex-wife on the phone and instructed her to also call to see when his follow-up is.  Foot exam completed today, eye exam up-to-date per patient, records requested.

## 2024-02-07 NOTE — ASSESSMENT & PLAN NOTE
Discussed lifestyle modification including reducing carbohydrates and calories in diet as well as increasing exercise/aerobic activity.  Recommend at least 30 minutes per day of aerobic exercise.

## 2024-02-07 NOTE — ASSESSMENT & PLAN NOTE
Restart Aricept 10 mg at bedtime.  Spoke with patient's ex-wife on the phone and instructed her that we would be restarting this medication.

## 2024-02-07 NOTE — ASSESSMENT & PLAN NOTE
Encouraged patient to call Dr. Crow to see when his follow-up is.  Spoke to patient's ex-wife on the phone and informed her that he is due for follow-up with Nephrology, she reports she will call to schedule.

## 2024-02-07 NOTE — PATIENT INSTRUCTIONS
Diabetes doctor - please call, you are due for appointment     Hernán Kennedy MD      Consulting Physician, Endocrinology    Since 12/1/2022    [phone]    Kidney doctor - please call you are due for appointment     Juan Crow MD      Nephrology     Since 2/7/2024     [phone]

## 2024-02-07 NOTE — PROGRESS NOTES
Patient ID: [id number]     Chief Complaint: Medicare AWV      HPI:     Modesto Franco is a 73 y.o. male here today for a Medicare Wellness. No other complaints today.       ----------------------------  Abnormal stress test  Back pain  CKD (chronic kidney disease)  Coronary arteriosclerosis  DM (diabetes mellitus)  Hyperlipidemia  Hypertension  Hypoglycemia, unspecified  Injury of kidney  Morbid obesity  DONATO (obstructive sleep apnea)  Polyneuropathy due to type 2 diabetes mellitus  Unspecified osteoarthritis, unspecified site     Past Surgical History:   Procedure Laterality Date    ANGIOGRAM, CAROTID N/A 9/12/2023    Procedure: Angiogram, Carotid;  Surgeon: Caridad Vaughn MD;  Location: [facility] CATH LAB;  Service: Cardiology;  Laterality: N/A;    APPENDECTOMY      BACK SURGERY      COLONOSCOPY  05/01/2014    Dr. Lele Dumont    CORONARY ARTERY BYPASS GRAFT      LEFT HEART CATHETERIZATION N/A 9/12/2023    Procedure: Left heart cath;  Surgeon: Caridad Vaughn MD;  Location: [facility] CATH LAB;  Service: Cardiology;  Laterality: N/A;    VASECTOMY         Review of patient's allergies indicates:  No Known Allergies    Outpatient Medications Marked as Taking for the 2/7/24 encounter (Office Visit) with Yamila Degroot FNP   Medication Sig Dispense Refill    alogliptin (NESINA) 25 mg Tab Take 25 mg by mouth once daily.      atorvastatin (LIPITOR) 80 MG tablet Take 80 mg by mouth before evening meal.      BRILINTA 90 mg tablet Take 90 mg by mouth 2 (two) times daily.      carvediloL (COREG) 3.125 MG tablet Take 3.125 mg by mouth 2 (two) times daily.      ELIQUIS 5 mg Tab Take 5 mg by mouth 2 (two) times daily.      glucose 4 GM chewable tablet 4 g.      insulin detemir U-100, Levemir, (LEVEMIR FLEXTOUCH U100 INSULIN) 100 unit/mL (3 mL) InPn pen Inject 75 Units into the skin 2 (two) times a day.      lisinopriL 10 MG tablet TAKE ONE TABLET BY MOUTH DAILY 90 tablet 1    mv-min/folic/K1/lycopen/lutein (CENTRUM SILVER  MEN ORAL) Take by mouth.      OZEMPIC 0.25 mg or 0.5 mg (2 mg/3 mL) pen injector Inject 0.5 mg into the skin every 7 days.         Social History     Socioeconomic History    Marital status:    Tobacco Use    Smoking status: Never     Passive exposure: Past    Smokeless tobacco: Never   Substance and Sexual Activity    Alcohol use: Not Currently    Drug use: Never    Sexual activity: Not Currently        Family History   Problem Relation Age of Onset    Diabetes Father     Hypertension Father     Heart disease Father         Patient Care Team:  [facility] as PCP - General  Hernán Kennedy MD as Consulting Physician (Endocrinology)  Ja Rivera DPM (Podiatry)  Seb Contreras OD (Ophthalmology)  Lele Dumont MD as Consulting Physician (Gastroenterology)  Marlon Prince MD as Consulting Physician (Cardiovascular Disease)  Caridad Vaughn MD as Consulting Physician (Cardiology)  Juan Crow MD (Nephrology)       Subjective:     Review of Systems   Constitutional: Negative.    HENT: Negative.     Eyes: Negative.    Respiratory: Negative.     Cardiovascular: Negative.    Gastrointestinal: Negative.    Genitourinary: Negative.    Musculoskeletal: Negative.    Skin: Negative.    Neurological: Negative.    Endo/Heme/Allergies: Negative.    Psychiatric/Behavioral: Negative.     All other systems reviewed and are negative.        Patient Reported Health Risk Assessment  What [age] 70-79  Are you male or female?: Male  During the past four weeks, how much have you been bothered by emotional problems such as feeling anxious, depressed, irritable, sad, or downhearted and blue?: Not at all  During the past five weeks, has your physical and/or emotional health limited your social activities with family, friends, neighbors, or groups?: Not at all  During the past four weeks, how much bodily pain have you generally had?: No pain  During the past four weeks, was someone available  to help if you needed and wanted help?: No, not at all  During the past four weeks, what was the hardest physical activity you could do for at least two minutes?: Moderate  Can you get to places out of walking distance without help?  (For example, can you travel alone on buses or taxis, or drive your own car?): Yes  Can you go shopping for groceries or clothes without someone's help?: Yes  Can you prepare your own meals?: Yes  Can you do your own housework without help?: Yes  Because of any health problems, do you need the help of another person with your personal care needs such as eating, bathing, dressing, or getting around the house?: No  Can you handle your own money without help?: Yes  During the past four weeks, how would you rate your health in general?: Very good  How have things been going for you during the past four weeks?: Pretty well  Are you having difficulties driving your car?: No  Do you always fasten your seat belt when you are in a car?: Yes, usually  How often in the past four weeks have you been bothered by falling or dizzy when standing up?: Never  How often in the past four weeks have you been bothered by sexual problems?: Never  How often in the past four weeks have you been bothered by trouble eating well?: Never  How often in the past four weeks have you been bothered by teeth or denture problems?: Never  How often in the past four weeks have you been bothered with problems using the telephone?: Never  How often in the past four weeks have you been bothered by tiredness or fatigue?: Always  Have you fallen two or more times in the past year?: No  Are you afraid of falling?: No  Are you a smoker?: No  During the past four weeks, how many drinks of wine, beer, or other alcoholic beverages did you have?: No alcohol at all  Do you exercise for about 20 minutes three or more days a week?: No, I usually do not exercise this much  Have you been given any information to help you with hazards in  "your house that might hurt you?: No  Have you been given any information to help you with keeping track of your medications?: No  How often do you have trouble taking medicines the way you've been told to take them?: I always take them as prescribed  How confident are you that you can control and manage most of your health problems?: Very confident  What is your race? (Check all that apply.):     Opioid Screening: Patient medication list reviewed, patient is not taking prescription opioids. Patient is not using additional opioids than prescribed. Patient is at low risk of substance abuse based on this opioid use history.      A personalized 5-10 year written screening schedule and personal prevention plan has been developed using the USPSTF age appropriate recommendations and this was provided to the patient at the end of todays visit. Please see the AVS for those details     Objective:     /72 (BP Location: Left arm)   Pulse 79   Temp 98.2 °F (36.8 °C) (Oral)   Resp 16   Ht 5' 10" (1.778 m)   Wt 114.4 kg (252 lb 3.2 oz)   SpO2 97%   BMI 36.19 kg/m²     Physical Exam  Constitutional:       Appearance: Normal appearance. He is obese.   HENT:      Head: Normocephalic and atraumatic.      Right Ear: Tympanic membrane, ear canal and external ear normal.      Left Ear: Tympanic membrane, ear canal and external ear normal.      Nose: Nose normal.      Mouth/Throat:      Mouth: Mucous membranes are moist.      Pharynx: Oropharynx is clear.   Eyes:      Extraocular Movements: Extraocular movements intact.      Conjunctiva/sclera: Conjunctivae normal.      Pupils: Pupils are equal, round, and reactive to light.   Cardiovascular:      Rate and Rhythm: Normal rate and regular rhythm.      Pulses: Normal pulses.           Dorsalis pedis pulses are 2+ on the right side and 2+ on the left side.        Posterior tibial pulses are 2+ on the right side and 2+ on the left side.   Pulmonary:      Effort: " Pulmonary effort is normal.      Breath sounds: Normal breath sounds.   Abdominal:      General: Abdomen is flat. Bowel sounds are normal.      Palpations: Abdomen is soft.   Musculoskeletal:         General: Normal range of motion.      Cervical back: Normal range of motion.   Feet:      Right foot:      Protective Sensation: 5 sites tested.  0 sites sensed.      Skin integrity: Skin integrity normal.      Toenail Condition: Right toenails are normal.      Left foot:      Protective Sensation: 5 sites tested.  0 sites sensed.      Skin integrity: Skin integrity normal.      Toenail Condition: Left toenails are normal.   Skin:     General: Skin is warm and dry.   Neurological:      General: No focal deficit present.      Mental Status: He is alert and oriented to person, place, and time.   Psychiatric:         Mood and Affect: Mood normal.         Behavior: Behavior normal.         Thought Content: Thought content normal.         Judgment: Judgment normal.                No data to display                  2/7/2024    10:00 AM 11/30/2023     1:20 PM 12/1/2022     8:40 AM   Fall Risk Assessment - Outpatient   Mobility Status Ambulatory Ambulatory Ambulatory   Number of falls 0 0 1   Identified as fall risk False False False           Depression Screening  Over the past two weeks, has the patient felt down, depressed, or hopeless?: No  Over the past two weeks, has the patient felt little interest or pleasure in doing things?: No  Functional Ability/Safety Screening  Was the patient's timed Up & Go test unsteady or longer than 30 seconds?: No  Does the patient need help with phone, transportation, shopping, preparing meals, housework, laundry, meds, or managing money?: No  Does the patient's home have rugs in the hallway, lack grab bars in the bathroom, lack handrails on the stairs or have poor lighting?: No  Have you noticed any hearing difficulties?: No  Cognitive Function (Assessed through direct observation with  due consideration of information obtained by way of patient reports and/or concerns raised by family, friends, caretakers, or others)    Does the patient repeat questions/statements in the same day?: No  Does the patient have trouble remembering the date, year, and time?: No  Does the patient have difficulty managing finances?: No  Does the patient have a decreased sense of direction?: No  Assessment and Plan       ICD-10-CM ICD-9-CM   1. Medicare annual wellness visit, subsequent  Z00.00 V70.0   2. Advanced care planning/counseling discussion  Z71.89 V65.49   3. Type 2 diabetes mellitus with diabetic polyneuropathy, with long-term current use of insulin  E11.42 250.60    Z79.4 357.2     V58.67   4. Morbid obesity  E66.01 278.01   5. Stage 3b chronic kidney disease  N18.32 585.3   6. Impaired memory  R41.3 780.93   7. Primary hypertension  I10 401.9   8. Hyperlipidemia, unspecified hyperlipidemia type  E78.5 272.4   9. Need for influenza vaccination  Z23 V04.81   10. Need for RSV immunization  Z29.11 V04.82   11. Physical deconditioning  R53.81 799.3     1. Medicare annual wellness visit, subsequent  Overview:  Medicare annual wellness visit yearly in February      2. Advanced care planning/counseling discussion  Assessment & Plan:  ACP documents reviewed, no changes requested.      3. Type 2 diabetes mellitus with diabetic polyneuropathy, with long-term current use of insulin  Overview:  Managed by endocrinology Dr Kennedy     Assessment & Plan:  Encouraged patient to schedule follow-up with endocrinology.  He should be due for follow-up next week.  Spoke to patient's ex-wife on the phone and instructed her to also call to see when his follow-up is.  Foot exam completed today, eye exam up-to-date per patient, records requested.    Orders:  -     Microalbumin/Creatinine Ratio, Urine    4. Morbid obesity  Assessment & Plan:  Discussed lifestyle modification including reducing carbohydrates and calories in diet as well  as increasing exercise/aerobic activity.  Recommend at least 30 minutes per day of aerobic exercise.        5. Stage 3b chronic kidney disease  Overview:  Dr Crow     Assessment & Plan:  Encouraged patient to call Dr. Crow to see when his follow-up is.  Spoke to patient's ex-wife on the phone and informed her that he is due for follow-up with Nephrology, she reports she will call to schedule.      6. Impaired memory  Overview:  09/21/2023 - trial of Aricept 10 mg QHS    Assessment & Plan:  Restart Aricept 10 mg at bedtime.  Spoke with patient's ex-wife on the phone and instructed her that we would be restarting this medication.    Orders:  -     donepeziL (ARICEPT) 10 MG tablet; Take 1 tablet (10 mg total) by mouth every evening.  Dispense: 90 tablet; Refill: 3    7. Primary hypertension  Overview:  Lisinopril 10 mg daily, Coreg 3.125 mg twice daily  [facility]    Assessment & Plan:  Stable, continue lisinopril and Coreg, [facility].      8. Hyperlipidemia, unspecified hyperlipidemia type  Overview:  Atorvastatin 80 mg daily  [facility]    Assessment & Plan:  Stable, continue atorvastatin 80 mg daily, follow-up with [facility] as scheduled.      9. Need for influenza vaccination  -     Influenza (FLUAD) - Quadrivalent (Adjuvanted) *Preferred* (65+) (PF)    10. Need for RSV immunization  -     RSVPreF3 antigen-AS01E, PF, (AREXVY, PF,) 120 mcg/0.5 mL SusR vaccine; Inject 0.5 mLs into the muscle once. for 1 dose  Dispense: 0.5 mL; Refill: 0    11. Physical deconditioning  Assessment & Plan:  Refer to [facility] for outpatient physical therapy consult    Orders:  -     Ambulatory referral/consult to Physical/Occupational Therapy; Future; Expected date: 02/14/2024              Medicare Annual Wellness and Personalized Prevention Plan:   Fall Risk + Home Safety + Hearing Impairment + Depression Screen + Cognitive Impairment Screen + Health Risk  Assessment all reviewed.       Health Maintenance Topics with due status: Not Due       Topic Last Completion Date    Lipid Panel 09/05/2023    High Dose Statin 02/07/2024      The patient's Health Maintenance was reviewed and the following appears to be due at this time:   Health Maintenance Due   Topic Date Due    Foot Exam  Never done    RSV Vaccine (Age 60+ and Pregnant patients) (1 - 1-dose 60+ series) Never done    Eye Exam  05/17/2022    Diabetes Urine Screening  09/08/2023    Hemoglobin A1c  12/05/2023    Colorectal Cancer Screening  05/01/2024         Advance Care Planning     Date: 02/07/2024    Living Will  During this visit, I engaged the patient  in the voluntary advance care planning process.  The patient and I reviewed the role for advance directives and their purpose in directing future healthcare if the patient's unable to speak for him/herself.  At this point in time, the patient does have full decision-making capacity.  We discussed different extreme health states that he could experience, and reviewed what kind of medical care he would want in those situations.  The patient communicated that if he were comatose and had little chance of a meaningful recovery, he would not want machines/life-sustaining treatments used. In addition to the above preference, other important end-of-life issues for the patient include  comfort . The patient has completed a living will to reflect these preferences.  I spent a total of 20 minutes engaging the patient in this advance care planning discussion.          Power of   I initiated the process of voluntary advance care planning today and explained the importance of this process to the patient.  I introduced the concept of advance directives to the patient, as well. Then the patient received detailed information about the importance of designating a Health Care Power of  (HCPOA). He was also instructed to communicate with this person about their  wishes for future healthcare, should he become sick and lose decision-making capacity. The patient has previously appointed a HCPOA. After our discussion, the patient has decided to complete a HCPOA and has appointed his daughter, health care agent:  Mary Jo Franco  & health care agent number:  [phone]  I spent a total time of 20 minutes discussing this issue with the patient.             Opioid Screening: Patient medication list reviewed, patient is not taking prescription opioids. Patient is not using additional opioids than prescribed. Patient is at low risk of substance abuse based on this opioid use history.     Medication List with Changes/Refills   New Medications    RSVPREF3 ANTIGEN-AS01E, PF, (AREXVY, PF,) 120 MCG/0.5 ML SUSR VACCINE    Inject 0.5 mLs into the muscle once. for 1 dose       Start Date: 2/7/2024  End Date: 2/7/2024   Current Medications    ALOGLIPTIN (NESINA) 25 MG TAB    Take 25 mg by mouth once daily.       Start Date: --        End Date: --    ATORVASTATIN (LIPITOR) 80 MG TABLET    Take 80 mg by mouth before evening meal.       Start Date: --        End Date: --    BRILINTA 90 MG TABLET    Take 90 mg by mouth 2 (two) times daily.       Start Date: 9/12/2023 End Date: --    CARVEDILOL (COREG) 3.125 MG TABLET    Take 3.125 mg by mouth 2 (two) times daily.       Start Date: 9/19/2023 End Date: --    ELIQUIS 5 MG TAB    Take 5 mg by mouth 2 (two) times daily.       Start Date: --        End Date: --    GLUCOSE 4 GM CHEWABLE TABLET    4 g.       Start Date: 4/4/2023  End Date: --    INSULIN DETEMIR U-100, LEVEMIR, (LEVEMIR FLEXTOUCH U100 INSULIN) 100 UNIT/ML (3 ML) INPN PEN    Inject 75 Units into the skin 2 (two) times a day.       Start Date: --        End Date: --    LISINOPRIL 10 MG TABLET    TAKE ONE TABLET BY MOUTH DAILY       Start Date: 3/29/2023 End Date: --    METFORMIN (GLUCOPHAGE-XR) 500 MG ER 24HR TABLET    Take 500 mg by mouth before evening meal.       Start Date:  11/9/2023 End Date: --    MV-MIN/FOLIC/K1/LYCOPEN/LUTEIN (CENTRUM SILVER MEN ORAL)    Take by mouth.       Start Date: --        End Date: --    OZEMPIC 0.25 MG OR 0.5 MG (2 MG/3 ML) PEN INJECTOR    Inject 0.5 mg into the skin every 7 days.       Start Date: 9/25/2023 End Date: --   Changed and/or Refilled Medications    Modified Medication Previous Medication    DONEPEZIL (ARICEPT) 10 MG TABLET donepeziL (ARICEPT) 10 MG tablet       Take 1 tablet (10 mg total) by mouth every evening.    Take 1 tablet (10 mg total) by mouth every evening.       Start Date: 2/7/2024  End Date: 2/6/2025    Start Date: 9/21/2023 End Date: 2/7/2024   Discontinued Medications    MULTIVITAMIN (THERAGRAN) PER TABLET    Take 1 tablet by mouth once daily.       Start Date: --        End Date: 2/7/2024        Follow up in about 6 months (around 8/7/2024) for follow up. In addition to their scheduled follow up, the patient has also been instructed to follow up on as needed basis.

## 2024-02-07 NOTE — LETTER
AUTHORIZATION FOR RELEASE OF   CONFIDENTIAL INFORMATION    Dear Dr Contreras,    We are seeing Modesto Franco, date of birth 1950, in the clinic at No Department Specified. [facility]. Modesto Franco has an outstanding lab/procedure at the time we reviewed his chart. In order to help keep his health information updated, he has authorized us to request the following medical record(s):        (  )  MAMMOGRAM                                      (  )  COLONOSCOPY      (  )  PAP SMEAR                                          (  )  OUTSIDE LAB RESULTS     (  )  DEXA SCAN                                          (X)  EYE EXAM            (  )  FOOT EXAM                                          (  )  ENTIRE RECORD     (  )  OUTSIDE IMMUNIZATIONS                 (  )  _______________         Please fax records to [facility], [facility], [phone]              Patient Name: Modesto Franco  : 1950  Patient Phone #: [phone]

## 2024-02-21 ENCOUNTER — TELEPHONE (OUTPATIENT)
Dept: FAMILY MEDICINE | Facility: CLINIC | Age: 74
End: 2024-02-21
Payer: MEDICARE

## 2024-02-21 NOTE — TELEPHONE ENCOUNTER
----- Message from Amy Ferro sent at 2/21/2024 12:10 PM CST -----  Regarding: call back  .Type:  Needs Medical Advice    Who Called: Josefina camargo/[facility]  Symptoms (please be specific):    How long has patient had these symptoms:    Pharmacy name and phone #:    Would the patient rather a call back or a response via [facility]? Call back  Best Call Back Number: [phone]  Additional Information: states she has made several attempts to call pt to schedule for physical therapy pt has not answered

## 2024-02-21 NOTE — TELEPHONE ENCOUNTER
Spoke with patient's family member who will have him call [facility] from her phone.

## 2024-05-13 PROBLEM — Z00.00 MEDICARE ANNUAL WELLNESS VISIT, SUBSEQUENT: Status: RESOLVED | Noted: 2022-12-01 | Resolved: 2024-05-13

## 2024-08-01 ENCOUNTER — TELEPHONE (OUTPATIENT)
Dept: FAMILY MEDICINE | Facility: CLINIC | Age: 74
End: 2024-08-01
Payer: MEDICARE

## 2024-08-01 NOTE — TELEPHONE ENCOUNTER
Attempted to contact patient for previsit on 8/1/24 at 8:19. LV reminding patient about his upcoming visit.

## 2024-08-09 ENCOUNTER — TELEPHONE (OUTPATIENT)
Dept: FAMILY MEDICINE | Facility: CLINIC | Age: 74
End: 2024-08-09
Payer: MEDICARE

## 2024-08-13 ENCOUNTER — TELEPHONE (OUTPATIENT)
Dept: FAMILY MEDICINE | Facility: CLINIC | Age: 74
End: 2024-08-13
Payer: MEDICARE

## 2024-08-14 ENCOUNTER — PATIENT MESSAGE (OUTPATIENT)
Dept: FAMILY MEDICINE | Facility: CLINIC | Age: 74
End: 2024-08-14
Payer: MEDICARE

## 2024-08-15 ENCOUNTER — TELEPHONE (OUTPATIENT)
Dept: FAMILY MEDICINE | Facility: CLINIC | Age: 74
End: 2024-08-15
Payer: MEDICARE

## 2024-08-15 DIAGNOSIS — R41.3 IMPAIRED MEMORY: ICD-10-CM

## 2024-08-15 NOTE — TELEPHONE ENCOUNTER
Spoke to patient's ex-wife. She will need to contact Dr Lang's office for insulin refills, we do not prescribe them

## 2024-08-15 NOTE — TELEPHONE ENCOUNTER
----- Message from Tracy Weldon sent at 8/15/2024  8:29 AM CDT -----  .Type:  Patient Returning Call    Who Called:pt's ex wife   Who Left Message for Patient:  Does the patient know what this is regarding?:clarification on the medication. States taht she was on the phone and the call dropped   Would the patient rather a call back or a response via [facility]? Call back   Best Call Back Number:[phone]  Additional Information:

## 2024-08-20 ENCOUNTER — PATIENT MESSAGE (OUTPATIENT)
Dept: FAMILY MEDICINE | Facility: CLINIC | Age: 74
End: 2024-08-20

## 2024-08-20 ENCOUNTER — OFFICE VISIT (OUTPATIENT)
Dept: FAMILY MEDICINE | Facility: CLINIC | Age: 74
End: 2024-08-20
Payer: MEDICARE

## 2024-08-20 ENCOUNTER — CLINICAL SUPPORT (OUTPATIENT)
Dept: FAMILY MEDICINE | Facility: CLINIC | Age: 74
End: 2024-08-20
Attending: NURSE PRACTITIONER
Payer: MEDICARE

## 2024-08-20 VITALS
HEART RATE: 71 BPM | OXYGEN SATURATION: 96 % | TEMPERATURE: 98 F | SYSTOLIC BLOOD PRESSURE: 134 MMHG | HEIGHT: 70 IN | RESPIRATION RATE: 16 BRPM | BODY MASS INDEX: 32.64 KG/M2 | DIASTOLIC BLOOD PRESSURE: 80 MMHG | WEIGHT: 228 LBS

## 2024-08-20 DIAGNOSIS — Z11.1 ENCOUNTER FOR PPD TEST: ICD-10-CM

## 2024-08-20 DIAGNOSIS — E11.3313 MODERATE NONPROLIFERATIVE DIABETIC RETINOPATHY OF BOTH EYES WITH MACULAR EDEMA ASSOCIATED WITH TYPE 2 DIABETES MELLITUS: ICD-10-CM

## 2024-08-20 DIAGNOSIS — E11.42 TYPE 2 DIABETES MELLITUS WITH DIABETIC POLYNEUROPATHY, WITH LONG-TERM CURRENT USE OF INSULIN: Primary | ICD-10-CM

## 2024-08-20 DIAGNOSIS — Z79.4 TYPE 2 DIABETES MELLITUS WITH DIABETIC POLYNEUROPATHY, WITH LONG-TERM CURRENT USE OF INSULIN: Primary | ICD-10-CM

## 2024-08-20 DIAGNOSIS — E11.42 TYPE 2 DIABETES MELLITUS WITH DIABETIC POLYNEUROPATHY, WITH LONG-TERM CURRENT USE OF INSULIN: ICD-10-CM

## 2024-08-20 DIAGNOSIS — Z79.4 TYPE 2 DIABETES MELLITUS WITH DIABETIC POLYNEUROPATHY, WITH LONG-TERM CURRENT USE OF INSULIN: ICD-10-CM

## 2024-08-20 DIAGNOSIS — N18.32 STAGE 3B CHRONIC KIDNEY DISEASE: ICD-10-CM

## 2024-08-20 DIAGNOSIS — E78.5 HYPERLIPIDEMIA, UNSPECIFIED HYPERLIPIDEMIA TYPE: ICD-10-CM

## 2024-08-20 DIAGNOSIS — I25.10 CORONARY ARTERY DISEASE INVOLVING NATIVE CORONARY ARTERY OF NATIVE HEART, UNSPECIFIED WHETHER ANGINA PRESENT: ICD-10-CM

## 2024-08-20 DIAGNOSIS — F03.90 DEMENTIA, UNSPECIFIED DEMENTIA SEVERITY, UNSPECIFIED DEMENTIA TYPE, UNSPECIFIED WHETHER BEHAVIORAL, PSYCHOTIC, OR MOOD DISTURBANCE OR ANXIETY: Primary | ICD-10-CM

## 2024-08-20 DIAGNOSIS — I10 PRIMARY HYPERTENSION: ICD-10-CM

## 2024-08-20 PROBLEM — Z91.199 NONCOMPLIANCE WITH TREATMENT: Status: ACTIVE | Noted: 2024-08-20

## 2024-08-20 LAB
ALBUMIN SERPL-MCNC: 3.7 G/DL (ref 3.4–4.8)
ALBUMIN/GLOB SERPL: 1.2 RATIO (ref 1.1–2)
ALP SERPL-CCNC: 103 UNIT/L (ref 40–150)
ALT SERPL-CCNC: 19 UNIT/L (ref 0–55)
ANION GAP SERPL CALC-SCNC: 10 MEQ/L
AST SERPL-CCNC: 19 UNIT/L (ref 5–34)
BASOPHILS # BLD AUTO: 0.04 X10(3)/MCL
BASOPHILS NFR BLD AUTO: 0.6 %
BILIRUB SERPL-MCNC: 1.3 MG/DL
BUN SERPL-MCNC: 16.2 MG/DL (ref 8.4–25.7)
CALCIUM SERPL-MCNC: 9.6 MG/DL (ref 8.8–10)
CHLORIDE SERPL-SCNC: 98 MMOL/L (ref 98–107)
CHOLEST SERPL-MCNC: 275 MG/DL
CHOLEST/HDLC SERPL: 7 {RATIO} (ref 0–5)
CO2 SERPL-SCNC: 25 MMOL/L (ref 23–31)
CREAT SERPL-MCNC: 1.57 MG/DL (ref 0.73–1.18)
CREAT/UREA NIT SERPL: 10
EOSINOPHIL # BLD AUTO: 0.07 X10(3)/MCL (ref 0–0.9)
EOSINOPHIL NFR BLD AUTO: 1.1 %
ERYTHROCYTE [DISTWIDTH] IN BLOOD BY AUTOMATED COUNT: 12.4 % (ref 11.5–17)
EST. AVERAGE GLUCOSE BLD GHB EST-MCNC: ABNORMAL MG/DL
GFR SERPLBLD CREATININE-BSD FMLA CKD-EPI: 46 ML/MIN/1.73/M2
GLOBULIN SER-MCNC: 3.1 GM/DL (ref 2.4–3.5)
GLUCOSE SERPL-MCNC: 337 MG/DL (ref 82–115)
HBA1C MFR BLD: >14 %
HCT VFR BLD AUTO: 48.1 % (ref 42–52)
HDLC SERPL-MCNC: 41 MG/DL (ref 35–60)
HGB BLD-MCNC: 16.4 G/DL (ref 14–18)
IMM GRANULOCYTES # BLD AUTO: 0.01 X10(3)/MCL (ref 0–0.04)
IMM GRANULOCYTES NFR BLD AUTO: 0.2 %
LDLC SERPL CALC-MCNC: 192 MG/DL (ref 50–140)
LYMPHOCYTES # BLD AUTO: 2.2 X10(3)/MCL (ref 0.6–4.6)
LYMPHOCYTES NFR BLD AUTO: 34.5 %
MCH RBC QN AUTO: 33.1 PG (ref 27–31)
MCHC RBC AUTO-ENTMCNC: 34.1 G/DL (ref 33–36)
MCV RBC AUTO: 97 FL (ref 80–94)
MONOCYTES # BLD AUTO: 0.56 X10(3)/MCL (ref 0.1–1.3)
MONOCYTES NFR BLD AUTO: 8.8 %
NEUTROPHILS # BLD AUTO: 3.5 X10(3)/MCL (ref 2.1–9.2)
NEUTROPHILS NFR BLD AUTO: 54.8 %
PLATELET # BLD AUTO: 280 X10(3)/MCL (ref 130–400)
PMV BLD AUTO: 9.9 FL (ref 7.4–10.4)
POTASSIUM SERPL-SCNC: 4.1 MMOL/L (ref 3.5–5.1)
PROT SERPL-MCNC: 6.8 GM/DL (ref 5.8–7.6)
RBC # BLD AUTO: 4.96 X10(6)/MCL (ref 4.7–6.1)
SODIUM SERPL-SCNC: 133 MMOL/L (ref 136–145)
TRIGL SERPL-MCNC: 210 MG/DL (ref 34–140)
VLDLC SERPL CALC-MCNC: 42 MG/DL
WBC # BLD AUTO: 6.38 X10(3)/MCL (ref 4.5–11.5)

## 2024-08-20 PROCEDURE — 36415 COLL VENOUS BLD VENIPUNCTURE: CPT | Performed by: NURSE PRACTITIONER

## 2024-08-20 PROCEDURE — 99214 OFFICE O/P EST MOD 30 MIN: CPT | Mod: ,,, | Performed by: NURSE PRACTITIONER

## 2024-08-20 PROCEDURE — 92228 IMG RTA DETC/MNTR DS PHY/QHP: CPT | Mod: ,,, | Performed by: INTERNAL MEDICINE

## 2024-08-20 PROCEDURE — 80053 COMPREHEN METABOLIC PANEL: CPT | Performed by: NURSE PRACTITIONER

## 2024-08-20 PROCEDURE — 83036 HEMOGLOBIN GLYCOSYLATED A1C: CPT | Performed by: NURSE PRACTITIONER

## 2024-08-20 PROCEDURE — 92228 IMG RTA DETC/MNTR DS PHY/QHP: CPT | Mod: 26,,, | Performed by: OPHTHALMOLOGY

## 2024-08-20 PROCEDURE — 80061 LIPID PANEL: CPT | Performed by: NURSE PRACTITIONER

## 2024-08-20 PROCEDURE — 85025 COMPLETE CBC W/AUTO DIFF WBC: CPT | Performed by: NURSE PRACTITIONER

## 2024-08-20 PROCEDURE — 36415 COLL VENOUS BLD VENIPUNCTURE: CPT | Mod: ,,, | Performed by: NURSE PRACTITIONER

## 2024-08-20 PROCEDURE — 86580 TB INTRADERMAL TEST: CPT | Mod: ,,, | Performed by: NURSE PRACTITIONER

## 2024-08-20 RX ORDER — METFORMIN HYDROCHLORIDE 500 MG/1
500 TABLET, EXTENDED RELEASE ORAL
Qty: 60 TABLET | Refills: 11 | Status: SHIPPED | OUTPATIENT
Start: 2024-08-20

## 2024-08-20 RX ORDER — ATORVASTATIN CALCIUM 80 MG/1
80 TABLET, FILM COATED ORAL
Qty: 30 TABLET | Refills: 11 | Status: SHIPPED | OUTPATIENT
Start: 2024-08-20

## 2024-08-20 RX ORDER — CARVEDILOL 3.12 MG/1
3.12 TABLET ORAL 2 TIMES DAILY
COMMUNITY
Start: 2024-08-14

## 2024-08-20 RX ORDER — ALOGLIPTIN 25 MG/1
25 TABLET, FILM COATED ORAL DAILY
Qty: 30 TABLET | Refills: 11 | Status: SHIPPED | OUTPATIENT
Start: 2024-08-20

## 2024-08-20 RX ORDER — LISINOPRIL 10 MG/1
10 TABLET ORAL DAILY
Qty: 30 TABLET | Refills: 11 | Status: SHIPPED | OUTPATIENT
Start: 2024-08-20

## 2024-08-20 NOTE — ASSESSMENT & PLAN NOTE
Has not followed up with endocrinology is not taking Ozempic.  Will not restart Ozempic at this time.  Will get him started back on his metformin and alogliptin at previous doses.  CMP and hemoglobin A1c today.  In office retina screening photo completed today.  Admission forms completed for admission to [facility] for long term care.

## 2024-08-20 NOTE — ASSESSMENT & PLAN NOTE
Lipid panel in office today.  Restart atorvastatin 80 mg daily.  Admission forms completed for admission to [facility] for long term care.

## 2024-08-20 NOTE — ASSESSMENT & PLAN NOTE
Has not followed up with Nephrology, will get CMP today.  Admission forms completed for admission to [facility] for long term care.

## 2024-08-20 NOTE — PROGRESS NOTES
Subjective:       Patient ID: Modesto Franco is a 73 y.o. male.    Chief Complaint: Diabetes (6m fu), Chronic Kidney Disease (6m fu), and Impaired Memory (6m fu)      HPI   This is a 73-year-old white male who presents to clinic today for evaluation for admission to [facility].  Patient reports to appointment unaccompanied.  History of dementia with medication noncompliance.  Lives home alone.  Has not filled any of his medications at the local pharmacy for the last 6 months.  Have been unable to get him in for appointment here as well.  Has not followed up with endocrinology, Cardiology, or Nephrology, or the [facility] either.  Reports that he is taking his insulin but unable to verify dosages or types of insulin or how many injections per day.  Unable to tell me what his last CBG was.  Review of Systems  Comprehensive review of systems negative except as stated in HPI    The patient's Health Maintenance was reviewed and the following appears to be due:   Health Maintenance Due   Topic Date Due    Colorectal Cancer Screening  05/01/2024    Eye Exam  05/18/2024    Hemoglobin A1c  06/12/2024    Lipid Panel  09/05/2024       Past Medical History:  Past Medical History:   Diagnosis Date    Abnormal stress test     Back pain     CKD (chronic kidney disease)     Coronary arteriosclerosis     DM (diabetes mellitus)     Hyperlipidemia     Hypertension     Hypoglycemia, unspecified     Injury of kidney     Morbid obesity     DONATO (obstructive sleep apnea)     Polyneuropathy due to type 2 diabetes mellitus     Unspecified osteoarthritis, unspecified site      Past Surgical History:   Procedure Laterality Date    ANGIOGRAM, CAROTID N/A 9/12/2023    Procedure: Angiogram, Carotid;  Surgeon: Caridad Vaughn MD;  Location: [facility] CATH LAB;  Service: Cardiology;  Laterality: N/A;    APPENDECTOMY      BACK SURGERY      COLONOSCOPY  05/01/2014    Dr. Lele Dumont    CORONARY ARTERY BYPASS GRAFT      LEFT HEART  CATHETERIZATION N/A 9/12/2023    Procedure: Left heart cath;  Surgeon: Caridad Vaughn MD;  Location: [facility] CATH LAB;  Service: Cardiology;  Laterality: N/A;    VASECTOMY       Review of patient's allergies indicates:  No Known Allergies  Current Outpatient Medications on File Prior to Visit   Medication Sig Dispense Refill    BRILINTA 90 mg tablet Take 90 mg by mouth 2 (two) times daily.      carvediloL (COREG) 3.125 MG tablet Take 3.125 mg by mouth 2 (two) times daily.      donepeziL (ARICEPT) 10 MG tablet Take 1 tablet (10 mg total) by mouth every evening. 90 tablet 3    glucose 4 GM chewable tablet 4 g.      insulin aspart U-100 (NOVOLOG FLEXPEN U-100 INSULIN) 100 unit/mL (3 mL) InPn pen 15 units + (1 unit per 10 pts above 140) Subcutaneous with breakfast, lunch and supper ()      insulin glargine U-100, Lantus, (LANTUS U-100 INSULIN) 100 unit/mL injection Inject 55 Units into the skin once daily.      mv-min/folic/K1/lycopen/lutein (CENTRUM SILVER MEN ORAL) Take by mouth.      [DISCONTINUED] alogliptin (NESINA) 25 mg Tab Take 25 mg by mouth once daily.      [DISCONTINUED] atorvastatin (LIPITOR) 80 MG tablet Take 80 mg by mouth before evening meal.      [DISCONTINUED] ELIQUIS 5 mg Tab Take 5 mg by mouth 2 (two) times daily.      [DISCONTINUED] lisinopriL 10 MG tablet TAKE ONE TABLET BY MOUTH DAILY 90 tablet 1    [DISCONTINUED] metFORMIN (GLUCOPHAGE-XR) 500 MG ER 24hr tablet Take 500 mg by mouth before evening meal.      [DISCONTINUED] semaglutide (OZEMPIC) 1 mg/dose (4 mg/3 mL) Inject 1 mg into the skin every 7 days.      [DISCONTINUED] carvediloL (COREG) 25 MG tablet Take 12.5 mg by mouth 2 (two) times daily. (Patient not taking: Reported on 8/20/2024)       No current facility-administered medications on file prior to visit.     Social History     Socioeconomic History    Marital status:    Tobacco Use    Smoking status: Never     Passive exposure: Past    Smokeless tobacco: Never   Substance and  "Sexual Activity    Alcohol use: Not Currently    Drug use: Never    Sexual activity: Not Currently     Family History   Problem Relation Name Age of Onset    Diabetes Father      Hypertension Father      Heart disease Father         Objective:       /80 (BP Location: Left arm)   Pulse 71   Temp 98.1 °F (36.7 °C) (Oral)   Resp 16   Ht 5' 10" (1.778 m)   Wt 103.4 kg (228 lb)   SpO2 96%   BMI 32.71 kg/m²      Physical Exam  Vitals and nursing note reviewed.   Constitutional:       Appearance: Normal appearance. He is obese.   HENT:      Head: Normocephalic and atraumatic.      Right Ear: Tympanic membrane, ear canal and external ear normal.      Left Ear: Tympanic membrane, ear canal and external ear normal.      Nose: Nose normal.      Mouth/Throat:      Mouth: Mucous membranes are moist.      Pharynx: Oropharynx is clear.   Eyes:      Extraocular Movements: Extraocular movements intact.      Conjunctiva/sclera: Conjunctivae normal.      Pupils: Pupils are equal, round, and reactive to light.   Cardiovascular:      Rate and Rhythm: Normal rate and regular rhythm.   Pulmonary:      Effort: Pulmonary effort is normal.      Breath sounds: Normal breath sounds.   Musculoskeletal:         General: Normal range of motion.      Cervical back: Normal range of motion and neck supple.   Skin:     General: Skin is warm and dry.   Neurological:      General: No focal deficit present.      Mental Status: He is alert. Mental status is at baseline.      Comments: Oriented to person and place    Psychiatric:         Mood and Affect: Mood normal.         Behavior: Behavior normal.         Thought Content: Thought content normal.         Judgment: Judgment normal.         Labs  Appointment on 08/12/2024   Component Date Value Ref Range Status    Hemoglobin A1C 03/12/2024 11.9 (A)  4.0 - 6.0 % Final       Assessment and Plan       ICD-10-CM ICD-9-CM   1. Dementia, unspecified dementia severity, unspecified dementia type, " unspecified whether behavioral, psychotic, or mood disturbance or anxiety  F03.90 294.20   2. Type 2 diabetes mellitus with diabetic polyneuropathy, with long-term current use of insulin  E11.42 250.60    Z79.4 357.2     V58.67   3. Stage 3b chronic kidney disease  N18.32 585.3   4. Primary hypertension  I10 401.9   5. Hyperlipidemia, unspecified hyperlipidemia type  E78.5 272.4   6. Encounter for PPD test  Z11.1 V74.1   7. Coronary artery disease involving native coronary artery of native heart, unspecified whether angina present  I25.10 414.01        1. Dementia, unspecified dementia severity, unspecified dementia type, unspecified whether behavioral, psychotic, or mood disturbance or anxiety  Overview:  09/21/2023 - trial of Aricept 10 mg QHS    Assessment & Plan:  Restart Aricept 10 mg at bedtime.  Patient unable to care for himself and manage medical regimen at home due to dementia and complicated medication regimen.  Has been off of all of his oral medications for the last 6 months.  Does report he has been taking insulin but can not verify what type of insulin or what doses.  [facility]  Has been very difficult to get him into the office for an appointment.  Long discussion with patient's family members last week and patient is in agreement for admission to [facility] with the [facility].  Patient reiterates agreement to this plan today in office.  Admission forms completed for admission to [facility] for long term care.       2. Type 2 diabetes mellitus with diabetic polyneuropathy, with long-term current use of insulin  Overview:  Managed by endocrinology Dr Kennedy     Assessment & Plan:  Has not followed up with endocrinology is not taking Ozempic.  Will not restart Ozempic at this time.  Will get him started back on his metformin and alogliptin at previous doses.  CMP and hemoglobin A1c today.  In office retina screening photo completed today.  Admission forms completed for  admission to [facility] for long term care.     Orders:  -     metFORMIN (GLUCOPHAGE-XR) 500 MG ER 24hr tablet; Take 1 tablet (500 mg total) by mouth before evening meal.  Dispense: 60 tablet; Refill: 11  -     alogliptin (NESINA) 25 mg Tab; Take 25 mg by mouth once daily.  Dispense: 30 tablet; Refill: 11  -     CBC Auto Differential  -     Comprehensive Metabolic Panel  -     Hemoglobin A1C  -     Diabetic Eye Screening Photo    3. Stage 3b chronic kidney disease  Overview:  Dr Crow     Assessment & Plan:  Has not followed up with Nephrology, will get CMP today.  Admission forms completed for admission to [facility] for long term [facility].     Orders:  -     CBC Auto Differential  -     Comprehensive Metabolic Panel    4. Primary hypertension  Overview:  Lisinopril 10 mg daily, Coreg 3.125 mg twice daily  [facility]    Assessment & Plan:  Refill lisinopril and Coreg.  Admission forms completed for admission to [facility] for [facility] term [facility].     Orders:  -     Comprehensive Metabolic Panel  -     lisinopriL 10 MG tablet; Take 1 tablet (10 mg total) by mouth once daily.  Dispense: 30 tablet; Refill: 11    5. Hyperlipidemia, unspecified hyperlipidemia type  Overview:  Atorvastatin 80 mg daily  [facility] with the [facility] every 6 months    Assessment & Plan:  Lipid panel in office today.  Restart atorvastatin 80 mg daily.  Admission forms completed for admission to [facility] for long term [facility].     Orders:  -     atorvastatin (LIPITOR) 80 MG tablet; Take 1 tablet (80 mg total) by mouth before evening meal.  Dispense: 30 tablet; Refill: 11  -     Comprehensive Metabolic Panel  -     Lipid Panel    6. Encounter for PPD test  Comments:  TB skin test say, return to clinic in 2 days for reading  Orders:  -     tuberculin injection 5 Units    7. Coronary artery disease involving native coronary artery of native heart, unspecified whether angina  present  Overview:  Managed by Dr Vaughn    09/12/2023 - [facility] per Dr Vaughn - severe native CAD - Successful PTCA, shockwave and drug-eluting stent Synergy 2.75/20 mm the left main and proximal circ.  Ostial left main was post dilated by using 3/14 atmospheres.          Assessment & Plan:   Admission forms completed for admission to [facility] for long term care.              Follow up in about 2 days (around 8/22/2024) for nurse visit TB skin test read.

## 2024-08-20 NOTE — PROGRESS NOTES
Patient presents for lab draw. L AC, tolerated well. Labs sent to [facility]  1TT, 2P

## 2024-08-20 NOTE — PROGRESS NOTES
Modesto Franco is a 73 y.o. male here for a diabetic eye screening with non-dilated fundus photos per Dr. Waterman.    Patient cooperative?: Yes  Small pupils?: No  Last eye exam: 5/18/2023    For exam results, see Encounter Report.

## 2024-08-20 NOTE — ASSESSMENT & PLAN NOTE
Restart Aricept 10 mg at bedtime.  Patient unable to care for himself and manage medical regimen at home due to dementia and complicated medication regimen.  Has been off of all of his oral medications for the last 6 months.  Does report he has been taking insulin but can not verify what type of insulin or what doses.  [facility]  Has been very difficult to get him into the office for an appointment.  Long discussion with patient's family members last week and patient is in agreement for admission to long-term [facility] with the [facility].  Patient reiterates agreement to this plan today in office.  Admission forms completed for admission to [facility] for long term care.

## 2024-08-20 NOTE — ASSESSMENT & PLAN NOTE
Refill lisinopril and Coreg.  Admission forms completed for admission to [facility] for long term care.

## 2024-08-20 NOTE — Clinical Note
Please call Lorraine and let her know he needs to return Thursday for PPD reading and then we will wend off [facility] paperwork. Let her know no follow up here after that, will be followed by MD's at the [facility]

## 2024-08-21 DIAGNOSIS — E11.3313 MODERATE NONPROLIFERATIVE DIABETIC RETINOPATHY OF BOTH EYES WITH MACULAR EDEMA ASSOCIATED WITH TYPE 2 DIABETES MELLITUS: Primary | ICD-10-CM

## 2024-08-21 NOTE — PROGRESS NOTES
Moderate bilateral diabetic retinopathy noted, recommend following up with a retinal specialist like Dr Chisholm. Will refer

## 2024-08-21 NOTE — PROGRESS NOTES
Hemoglobin A1c greater than 14, total cholesterol 275, .  Kidney function stable.  Recommend restarting medications as prescribed yesterday.

## 2024-09-20 NOTE — TELEPHONE ENCOUNTER
No-show to appointment for primary care on 08/08/2024 and 08/12/2024.  Does not look like he has seen endocrinology since March of this year, was supposed to follow-up in 6 weeks.  Has not seen the [facility] or cardiology recently either.  Spoke to patient's daughter, Mary Jo, and discussed concerns.  Discussed possible need for assisted living or nursing home care or maybe he could live with family members.  Daughter reports that they had discussed this with him and had already started paperwork for the [facility] home.  States that she will fax it over to me and speak to her father and check on him tonight.   Surgical Assessment Completed on: 20-Sep-2024 05:19

## 2024-09-23 DIAGNOSIS — E11.42 TYPE 2 DIABETES MELLITUS WITH DIABETIC POLYNEUROPATHY, WITH LONG-TERM CURRENT USE OF INSULIN: ICD-10-CM

## 2024-09-23 DIAGNOSIS — Z79.4 TYPE 2 DIABETES MELLITUS WITH DIABETIC POLYNEUROPATHY, WITH LONG-TERM CURRENT USE OF INSULIN: ICD-10-CM

## 2024-09-23 RX ORDER — GABAPENTIN 300 MG/1
CAPSULE ORAL
Qty: 270 CAPSULE | Refills: 3 | OUTPATIENT
Start: 2024-09-23

## 2024-10-02 ENCOUNTER — TELEPHONE (OUTPATIENT)
Dept: FAMILY MEDICINE | Facility: CLINIC | Age: 74
End: 2024-10-02
Payer: MEDICARE

## 2024-10-02 DIAGNOSIS — E11.3313 MODERATE NONPROLIFERATIVE DIABETIC RETINOPATHY OF BOTH EYES WITH MACULAR EDEMA ASSOCIATED WITH TYPE 2 DIABETES MELLITUS: Primary | ICD-10-CM

## 2024-10-02 NOTE — TELEPHONE ENCOUNTER
Spoke with patient's daughter. She will call Dr Contreras's office to schedule.  For [facility] nursing home, patient does not want to leave yet. They do have someone going daily to check on patient and make sure medications are being taken. They are still in contact with [facility] about [facility] him when he is ready

## 2024-10-02 NOTE — TELEPHONE ENCOUNTER
Dr Chisholm's office declined referral.  They said that the referral needs to come from an eye doctor.  Please call patient's caregivers and let them know that he is already established with Dr. Contreras, he needs to schedule an appointment with him for an eye exam.  Also, to they get him in to the [facility] nursing home?  If so, they can schedule an eye exam wherever he is now located

## 2024-10-02 NOTE — TELEPHONE ENCOUNTER
If he did not go into the nursing home, he needs to reschedule his appointments with all of his specialists.  He needs an appointment with Dr. Crow, nephrology.  Dr. Kennedy, endocrinology.  And with [facility] in [place].

## 2024-10-02 NOTE — TELEPHONE ENCOUNTER
----- Message from Med Assistant Franco sent at 10/2/2024  7:20 AM CDT -----  Regarding: FW: Referral to opthamology    ----- Message -----  From: Katherine Landa  Sent: 10/1/2024   4:46 PM CDT  To: [facility] Staff  Subject: Referral to opthamology                          Good afternoon, my name is Katherine with the Referral Team. I spoke with Josefina @ Dr David Chisholm's office and she stated that after reviewing the referral the patient has to be referred to them by an Ophthalmologist. She stated that the patient has to have the correct diagnosis to be seen by Dr Chisholm. If you have any additional questions Josefina can be reached @ [phone].

## (undated) DEVICE — CATH IMPULSE AR2 5FR 100CM

## (undated) DEVICE — CATH NC EMERGE MR 3X8MM

## (undated) DEVICE — CATH GUIDE LINER  V3 6F

## (undated) DEVICE — BLLN SC EUPHORA RX 3.00MMX15MM

## (undated) DEVICE — GUIDEWIRE INQWIRE SE 3MM JTIP

## (undated) DEVICE — KIT MANIFOLD LOW PRESS TUBING

## (undated) DEVICE — KIT HAND CONTROL HIGH PRESSUR

## (undated) DEVICE — CANNULA DUAL CO2/O2 NASAL 7FT

## (undated) DEVICE — PAD DEFIB CADENCE ADULT R2

## (undated) DEVICE — Device

## (undated) DEVICE — SET EXT NAMIC ARTERIAL 12IN

## (undated) DEVICE — CATH NC EMERGE MR 2.5X15MM

## (undated) DEVICE — CATH GUIDE LAUNCHER EBU 3.5SH

## (undated) DEVICE — SHEATH INTRODUCER 5FR 10CM

## (undated) DEVICE — SHEATH INTRODUCER BRITE 6X23

## (undated) DEVICE — PACK OR CLEAN UP COMBO SIZE 2

## (undated) DEVICE — DRESSING TEGADERM 4.4X5IN

## (undated) DEVICE — GUIDEWIRE RUNTHROUGH EF 180CM

## (undated) DEVICE — GUIDE LAUNCHER 6FR EBU 3.0

## (undated) DEVICE — CATH SHOCKWAVE C2 IVL 2.5X12MM

## (undated) DEVICE — CATH EMERGE MR 20 X 2.50

## (undated) DEVICE — CATH IMPULSE IM CRV 100CM 5FR

## (undated) DEVICE — CONTRAST ISOVUE 370 500ML MULT

## (undated) DEVICE — GLOVE 7.5 PROTEXIS PI MICRO

## (undated) DEVICE — INDEFLATOR ENCORE M001151062

## (undated) DEVICE — VALVE CONTROL COPILOT

## (undated) DEVICE — CATH IMPULSE MPA1 5FR 100CM

## (undated) DEVICE — CATH EMERGE MR 15 X 2.00

## (undated) DEVICE — COVER PROBE US 5.5X58L NON LTX

## (undated) DEVICE — CATH IMPULSE MP 5FR 145CM